# Patient Record
Sex: MALE | Race: WHITE
[De-identification: names, ages, dates, MRNs, and addresses within clinical notes are randomized per-mention and may not be internally consistent; named-entity substitution may affect disease eponyms.]

---

## 2017-04-11 ENCOUNTER — HOSPITAL ENCOUNTER (OUTPATIENT)
Dept: HOSPITAL 56 - MW.CHRC | Age: 82
Discharge: HOME | End: 2017-04-11
Attending: FAMILY MEDICINE
Payer: MEDICARE

## 2017-04-11 DIAGNOSIS — E03.9: Primary | ICD-10-CM

## 2017-04-11 DIAGNOSIS — E11.9: ICD-10-CM

## 2017-04-11 DIAGNOSIS — M15.9: ICD-10-CM

## 2017-04-11 DIAGNOSIS — I10: ICD-10-CM

## 2017-04-11 LAB
CHLORIDE SERPL-SCNC: 101 MMOL/L (ref 98–110)
SODIUM SERPL-SCNC: 133 MMOL/L (ref 136–146)

## 2017-04-11 PROCEDURE — G0463 HOSPITAL OUTPT CLINIC VISIT: HCPCS

## 2018-07-03 ENCOUNTER — HOSPITAL ENCOUNTER (EMERGENCY)
Dept: HOSPITAL 56 - MW.ED | Age: 83
LOS: 1 days | Discharge: LEFT BEFORE DISCHARGE | End: 2018-07-04
Payer: MEDICARE

## 2018-07-03 VITALS — DIASTOLIC BLOOD PRESSURE: 65 MMHG | SYSTOLIC BLOOD PRESSURE: 138 MMHG

## 2018-07-03 DIAGNOSIS — E03.9: ICD-10-CM

## 2018-07-03 DIAGNOSIS — H53.131: Primary | ICD-10-CM

## 2018-07-03 DIAGNOSIS — G30.9: ICD-10-CM

## 2018-07-03 DIAGNOSIS — F32.9: ICD-10-CM

## 2018-07-03 DIAGNOSIS — Z87.891: ICD-10-CM

## 2018-07-03 DIAGNOSIS — F02.80: ICD-10-CM

## 2018-07-03 DIAGNOSIS — Z88.7: ICD-10-CM

## 2018-07-03 DIAGNOSIS — Z79.899: ICD-10-CM

## 2018-07-03 DIAGNOSIS — D64.9: ICD-10-CM

## 2018-07-03 DIAGNOSIS — I10: ICD-10-CM

## 2018-07-03 DIAGNOSIS — J44.9: ICD-10-CM

## 2018-07-03 DIAGNOSIS — E11.9: ICD-10-CM

## 2018-07-03 DIAGNOSIS — Z79.84: ICD-10-CM

## 2018-07-04 NOTE — EDM.PDOC
ED HPI GENERAL MEDICAL PROBLEM





- General


Chief Complaint: ENT Problem


Stated Complaint: TROUBLE WITH EYES


Time Seen by Provider: 07/03/18 22:22


Source of Information: Reports: Patient, Family


History Limitations: Reports: No Limitations





- History of Present Illness


INITIAL COMMENTS - FREE TEXT/NARRATIVE: 





HISTORY AND PHYSICAL:





History of present illness:


85-year-old male presenting to the emergency department with family for chief 

complaint of acute right vision loss with past medical history of macular 

degeneration and dementia.





As per son he states that his father was complaining of some blurry vision 

yesterday which progressed to the point where his was not able to see out of 

his right eye. He has been no associated pain. Does have a history of macular 

degeneration. Son states that the ophthalmologist told him to come to the 

emergency department if he had any sudden vision loss and that is the primary 

reason why they came today. Patient denies any headache, jaw claudication, fever

, scalp tenderness.





I did talk to Dr. Soto on call ophthalmologist and reviewed the patient with 

him. He suspected that secondary to no pain or significant pupil changes this 

was not a emergency situation and that they could follow-up with him on 

Thursday morning at [address] [facility] [place]. I did talk to the the patient and 

his son about this.





When I return to get intraocular pressures the patient and son had eloped. 





Review of systems: 


As per history of present illness and below otherwise all systems reviewed and 

negative.





Past medical history: 


As per history of present illness and as reviewed below otherwise 

noncontributory.





Surgical history: 


As per history of present illness and as reviewed below otherwise 

noncontributory.





Social history: 


No reported history of drug or alcohol abuse.





Family history: 


As per history of present illness and as reviewed below otherwise 

noncontributory.





Physical exam:


HEENT: Atraumatic, normocephalic, pupils reactive, negative for conjunctival 

pallor or scleral icterus, mucous membranes moist, throat clear, neck supple, 

nontender, trachea midline.


Lungs: Clear to auscultation, breath sounds equal bilaterally, chest nontender.


Heart: S1S2, regular, negative for clicks, rubs, or JVD.


Abdomen: Soft, nondistended, nontender. Negative for masses or 

hepatosplenomegaly. Negative for costovertebral tenderness.


Pelvis: Stable nontender.


Genitourinary: Deferred.


Rectal: Deferred.


Extremities: Atraumatic, negative for cords or calf pain. Neurovascular 

unremarkable.


Neuro: Awake, alert, oriented. Cranial nerves II through XII unremarkable. 

Cerebellum unremarkable. Motor and sensory unremarkable throughout. Exam 

nonfocal.





Diagnostics:


[]





Therapeutics:


[]





Impression: 


Acute vision loss





Plan:


As above the patient eloped before I could get any further assessment. Please 

see H&P.








Definitive disposition and diagnosis as appropriate pending reevaluation and 

review of above.











- Related Data


 Allergies











Allergy/AdvReac Type Severity Reaction Status Date / Time


 


pneumococcal vaccine Allergy  Other Verified 07/03/18 22:51











Home Meds: 


 Home Meds





Cholecalciferol (Vitamin D3) [Vitamin D] 2,000 units PO DAILY 10/31/16 [History]


Donepezil HCl 10 mg PO BEDTIME 10/31/16 [History]


Fluticasone Propionate [Flovent] 1 spray NASBOTH DAILY 10/31/16 [History]


Levothyroxine Sodium [Synthroid] 175 mcg PO DAILY 10/31/16 [History]


NIFEdipine [Nifedipine ER] 30 mg PO DAILY 10/31/16 [History]


Sertraline HCl 100 mg PO BEDTIME 10/31/16 [History]


glipiZIDE [Glipizide ER] 2.5 mg PO DAILY 10/31/16 [History]


predniSONE 10 mg PO ASDIRECTED 10/31/16 [History]


Acetaminophen 650 mg PO Q4HR PRN 07/03/18 [History]


Acetaminophen with Codeine [Tylenol with Codeine #3 Tablet] 0 mg PO TID PRN 07/ 03/18 [History]


Albuterol [Ventolin HFA] 2 puff INH Q4HR PRN 07/03/18 [History]


Cholecalciferol (Vitamin D3) [Vitamin D] 2,000 unit PO DAILY 07/03/18 [History]


Diphenoxylate HCl/Atropine [Lomotil] 0.025 - 2.5 mg PO TID PRN 07/03/18 [History

]


Diphenoxylate HCl/Atropine [Lomotil] 1 tab PO Q6H PRN 07/03/18 [History]


Ferrous Sulfate 325 mg PO TID 07/03/18 [History]


Lactase 0 unit PO ASDIRECTED 07/03/18 [History]


Lidocaine 4% [Xylocaine 4% Top Soln] 50 ml TOP ASDIRECTED 07/03/18 [History]


Memantine [Namenda] 5 mg PO BID 07/03/18 [History]


Pantoprazole Sodium [Protonix] 20 mg PO ASDIRECTED 07/03/18 [History]


Vit A/Vit C/Vit E/Zinc/Copper [Preservision] 0 mg PO DAILY 07/03/18 [History]











Past Medical History


HEENT History: Reports: Hard of Hearing, Macular Degeneration


Other HEENT History: chronic Rhinitis


Cardiovascular History: Reports: Hypertension


Respiratory History: Reports: COPD, Other (See Below)


Other Respiratory History: Emphysema


Other Gastrointestinal History: hx ulcer, abnormal weight loss, personal 

history of diseases of digestive system, unspecified abdominal pain, diarrhea


Genitourinary History: Reports: None


Other Genitourinary History: UTI


Musculoskeletal History: Reports: Osteoarthritis, Other (See Below)


Other Musculoskeletal History: pain to left knee


Neurological History: Reports: Alzheimers Disease


Other Neuro History: Amnesia, mild cognitive impairment, personal history of 

diseases of the nervous system and sense organs, dementia without behavioral 

disturbance


Psychiatric History: Reports: Depression


Endocrine/Metabolic History: Reports: Diabetes, Type II, Hypothyroidism, Other (

See Below)


Other Endocrine/Metabolic History: Endocrine, nutritional and metabolic disease


Hematologic History: Reports: Anemia


Immunologic History: Reports: None


Oncologic (Cancer) History: Reports: None


Dermatologic History: Reports: Other (See Below)


Other Dermatologic History: ganglion, right wrist





- Past Surgical History


Head Surgeries/Procedures: Reports: None


HEENT Surgical History: Reports: Cataract Surgery


Cardiovascular Surgical History: Reports: None


Respiratory Surgical History: Reports: None


Other GI Surgeries/Procedures: hx of surgery for ulcer,  (procedure was done 50 

yrs ago.  son is not sure what was done)


Male  Surgical History: Reports: None


Endocrine Surgical History: Reports: None


Neurological Surgical History: Reports: None


Musculoskeletal Surgical History: Reports: None


Dermatological Surgical History: Reports: None





Social & Family History





- Family History


Family Medical History: Noncontributory





- Tobacco Use


Smoking Status *Q: Former Smoker


Used Tobacco, but Quit: Yes


Month/Year Tobacco Last Used: "45years ago"





- Caffeine Use


Caffeine Use: Reports: None





- Recreational Drug Use


Recreational Drug Use: No





ED ROS GENERAL





- Review of Systems


Review Of Systems: ROS reveals no pertinent complaints other than HPI.





ED EXAM, GENERAL





- Physical Exam


Exam: See Below





Course





- Vital Signs


Last Recorded V/S: 





 Last Vital Signs











Temp  97.6 F   07/03/18 22:37


 


Pulse  61   07/03/18 22:37


 


Resp  22 H  07/03/18 22:37


 


BP  138/65   07/03/18 22:37


 


Pulse Ox  96   07/03/18 22:37














Departure





- Departure


Time of Disposition: 00:11


Disposition: Eloped 07


Condition: Undetermined


Clinical Impression: 


Acute loss of vision


Qualifiers:


 Laterality: right Qualified Code(s): H53.131 - Sudden visual loss, right eye








- Discharge Information


Referrals: 


PCP,None [Primary Care Provider] -

## 2019-06-03 ENCOUNTER — HOSPITAL ENCOUNTER (INPATIENT)
Dept: HOSPITAL 56 - MW.ED | Age: 84
LOS: 4 days | Discharge: NURSING, REHAB OR LONG-TERM CARE | DRG: 871 | End: 2019-06-07
Attending: INTERNAL MEDICINE | Admitting: INTERNAL MEDICINE
Payer: MEDICARE

## 2019-06-03 DIAGNOSIS — Z66: ICD-10-CM

## 2019-06-03 DIAGNOSIS — Z79.899: ICD-10-CM

## 2019-06-03 DIAGNOSIS — Z51.5: ICD-10-CM

## 2019-06-03 DIAGNOSIS — H91.90: ICD-10-CM

## 2019-06-03 DIAGNOSIS — A41.89: Primary | ICD-10-CM

## 2019-06-03 DIAGNOSIS — F02.80: ICD-10-CM

## 2019-06-03 DIAGNOSIS — J44.9: ICD-10-CM

## 2019-06-03 DIAGNOSIS — D64.9: ICD-10-CM

## 2019-06-03 DIAGNOSIS — Z79.82: ICD-10-CM

## 2019-06-03 DIAGNOSIS — J43.9: ICD-10-CM

## 2019-06-03 DIAGNOSIS — F05: ICD-10-CM
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
## 2019-06-03 DIAGNOSIS — M19.90: ICD-10-CM

## 2019-06-03 DIAGNOSIS — R50.9: ICD-10-CM

## 2019-06-03 DIAGNOSIS — E03.9: ICD-10-CM

## 2019-06-03 DIAGNOSIS — Z79.4: ICD-10-CM

## 2019-06-03 DIAGNOSIS — H35.30: ICD-10-CM

## 2019-06-03 DIAGNOSIS — Z79.890: ICD-10-CM

## 2019-06-03 DIAGNOSIS — Z88.8: ICD-10-CM

## 2019-06-03 DIAGNOSIS — N17.9: ICD-10-CM

## 2019-06-03 DIAGNOSIS — E87.6: ICD-10-CM

## 2019-06-03 DIAGNOSIS — K59.09: ICD-10-CM

## 2019-06-03 DIAGNOSIS — Z98.49: ICD-10-CM

## 2019-06-03 DIAGNOSIS — J96.01: ICD-10-CM

## 2019-06-03 DIAGNOSIS — Z88.7: ICD-10-CM

## 2019-06-03 DIAGNOSIS — G93.41: ICD-10-CM

## 2019-06-03 DIAGNOSIS — R09.02: ICD-10-CM

## 2019-06-03 DIAGNOSIS — Z79.84: ICD-10-CM

## 2019-06-03 DIAGNOSIS — I10: ICD-10-CM

## 2019-06-03 DIAGNOSIS — G30.9: ICD-10-CM

## 2019-06-03 DIAGNOSIS — F32.9: ICD-10-CM

## 2019-06-03 DIAGNOSIS — E87.0: ICD-10-CM

## 2019-06-03 DIAGNOSIS — J18.9: ICD-10-CM

## 2019-06-03 DIAGNOSIS — R74.8: ICD-10-CM

## 2019-06-03 DIAGNOSIS — E11.9: ICD-10-CM

## 2019-06-03 DIAGNOSIS — Z79.52: ICD-10-CM

## 2019-06-03 DIAGNOSIS — M19.91: ICD-10-CM

## 2019-06-03 LAB
CHLORIDE SERPL-SCNC: 104 MMOL/L (ref 98–107)
SODIUM SERPL-SCNC: 142 MMOL/L (ref 136–148)

## 2019-06-03 PROCEDURE — C9113 INJ PANTOPRAZOLE SODIUM, VIA: HCPCS

## 2019-06-03 PROCEDURE — A4217 STERILE WATER/SALINE, 500 ML: HCPCS

## 2019-06-03 RX ADMIN — DEXTROSE SCH UNITS: 10 SOLUTION INTRAVENOUS at 17:00

## 2019-06-03 RX ADMIN — METHYLPREDNISOLONE SODIUM SUCCINATE SCH MG: 40 INJECTION, POWDER, FOR SOLUTION INTRAMUSCULAR; INTRAVENOUS at 21:08

## 2019-06-03 RX ADMIN — LEVOFLOXACIN SCH MLS/HR: 750 INJECTION, SOLUTION INTRAVENOUS at 17:00

## 2019-06-03 NOTE — CR
INDICATION:



Hypoxia



TECHNIQUE:



Chest 2 views



COMPARISON:



12/06/2018



FINDINGS:



Cardiovascular and mediastinum:  Heart size and vasculature are normal in 

caliber and appearance.  



Lungs and pleural spaces:  Possible right infrahilar infiltrate versus 

vascular crowding, however this is improved. Remainder of the lungs and 

pleural spaces are clear. 



Bones and soft tissues:  No significant findings.



IMPRESSION:



Persistent but improved infiltrate versus normal vascular crowding in the 

right infrahilar region. No other finding to explain hypoxia.



Dictated by Rafael Abraham MD @ [facility]  3 2019  1:33PM



Signed by Dr. Rafael Abraham @ [facility]  3 2019  1:38PM

## 2019-06-03 NOTE — CR
INDICATION:



Pain after fall.



TECHNIQUE:



One view.



IMPRESSION:



Moderate osteoarthritis of both hips. Mild osteoarthritis symphysis pubis 

and sacroiliac joints. No acute fracture or traumatic malalignment. Penile 

implants.



Dictated by Latrell Stockton MD @ [facility]  3 2019 12:48PM



Signed by Dr. Latrell Stockton @ [facility]  3 2019 12:48PM

## 2019-06-03 NOTE — CT
INDICATION:



Fall.



TECHNIQUE:



Multidetector imaging posture fossa to thoracic inlet.



FINDINGS:



Anatomic alignment. Mild degenerative disc disease C5-6 and C6-C7 as well 

as at C7-T1. Moderately severe facet arthrosis through the mid cervical 

spine more prominent on the right C3-4 through CE 5 6. No acute fracture or 

significant bone lesion. No neural foraminal or central canal bony 

encroachment of significance.



IMPRESSION:



No acute fracture or traumatic malalignment. Moderate degenerative joint 

disease and mild degenerative disc disease.



Please note that all CT scans at this facility use dose modulation, 

iterative reconstruction, and/or weight-based dosing when appropriate to 

reduce radiation dose to as low as reasonably achievable.



Dictated by Latrell Stockton MD @ [facility]  3 2019 12:40PM



Signed by Dr. Latrell Stockton @ [facility]  3 2019 12:42PM

## 2019-06-03 NOTE — EDM.PDOC
ED HPI GENERAL MEDICAL PROBLEM





- General


Chief Complaint: Trauma


Stated Complaint: FEVER


Time Seen by Provider: 06/03/19 11:47


Source of Information: Reports: Nursing Home Records


History Limitations: Reports: No Limitations, Other (End stage dementia)





- History of Present Illness


INITIAL COMMENTS - FREE TEXT/NARRATIVE: 


HISTORY AND PHYSICAL:





History of present illness:


Patient is an 86-year-old male, with history of end-stage dementia, presents to 

the ED today from [facility] for concern of hypoxia and 

fever. Per nursing staff, this morning patient was found with a recliner 

flipped and on the ground. The nursing staff had performed routinely vital 

signs and saw that patient had a fever 102. They're given him Tylenol and upon 

second vital check he goes oxygenation around 80% with a fever. They 

immediately transported him to the ER. In the ER, patient not able to answer 

questions or concerns due to underlying chronic dementia. Patient does take a 

daily aspirin.





Patient denies fever, chills, chest pain, shortness of breath, or cough. Denies 

headache, neck stiff ness, change in vision, syncope, or near syncope. Denies 

nausea, vomiting, abdominal pain, diarrhea, constipation, or dysuria. Has not 

noted any blood in urine or stool. Patient has been eating and drinking 

appropriately.





Review of systems: 


As per history of present illness and below otherwise all systems reviewed and 

negative.





Past medical history: 


As per history of present illness and as reviewed below otherwise 

noncontributory.





Surgical history: 


As per history of present illness and as reviewed below otherwise 

noncontributory.





Social history: 


See social history for further information





Family history: 


As per history of present illness and as reviewed below otherwise 

noncontributory.





Physical exam: Exam is limited due to patient's underlying dementia.


General: Patient is alert, and in no acute distress. Patient laying comfortably 

on exam table but combative with staff, noted to be combative per his baseline.


HEENT: Atraumatic, normocephalic, pupils equal and reactive bilaterally, 

negative for conjunctival pallor or scleral icterus, mucous membranes moist, 

TMs normal bilaterally, throat clear, neck supple, nontender, trachea midline. 

No drooling or trismus noted. No meningeal signs. No hot potato voice noted. 


Lungs: Diffuse bilateral coarse crackles to auscultation, breath sounds equal 

bilaterally, chest nontender.


Heart: S1S2, regular rate and rhythm without overt murmur


Abdomen: Soft, nondistended, nontender. Negative for masses or 

hepatosplenomegaly. Negative for costovertebral tenderness.


Pelvis: Stable nontender.


Genitourinary: Deferred.


Rectal: Deferred.


Skin: Intact, warm, dry. No lesions or rashes noted.


Extremities: Atraumatic, negative for cords or calf pain. Neurovascular 

unremarkable. No obvious deformities, step-offs, or crepitus of the complete 

spine.


Neuro: Awake, alert. Exam nonfocal.





Notes:


Trauma alert was called upon arrival to the ED. Dr. Joseph directly involved in 

patient care. 


78% on room air. Patient not able to tolerate nasal cannula. Placed on blow-by 

oxygen and stating 90%.


EKG does show LBB but this was also seen on EKG from 2016. Repeat EKG no change.


Elevated troponin, discussed this with patient's power of , his son, 

who did physically present to the ED. Son would desire admission to our 

hospital and not be transferred at this time. Patient is a DNR, code level 3.


Dr. Jones was contacted on patient and will admit to observation.





Diagnostics:


CBC, CMP, INR, troponin, EKG, UA, chest x-ray, pelvic x-ray, head CT, cervical 

spine CT, BNP





Therapeutics:


Ativan, duoneb





Impression: 


Elevated troponin


Hypoxia


Dementia





Plan:


1. Admit to observation to Dr. Jones





Definitive disposition and diagnosis as appropriate pending reevaluation and 

review of above.








- Related Data


 Allergies











Allergy/AdvReac Type Severity Reaction Status Date / Time


 


pneumococcal vaccine Allergy  Other Verified 06/03/19 11:59











Home Meds: 


 Home Meds





Donepezil HCl 10 mg PO BEDTIME 10/31/16 [History]


Fluticasone Propionate [Flovent] 1 spray NASBOTH DAILY 10/31/16 [History]


Levothyroxine Sodium [Synthroid] 175 mcg PO DAILY 10/31/16 [History]


NIFEdipine [Nifedipine ER] 30 mg PO DAILY 10/31/16 [History]


Sertraline HCl 100 mg PO BEDTIME 10/31/16 [History]


Acetaminophen 650 mg PO Q4HR PRN 07/03/18 [History]


Acetaminophen with Codeine [Tylenol with Codeine #3 Tablet] 1 tab PO TID PRN 07/ 03/18 [History]


Albuterol [Ventolin HFA] 2 puff INH Q4HR PRN 07/03/18 [History]


Ferrous Sulfate 325 mg PO TID 07/03/18 [History]


Lactase 0 unit PO ASDIRECTED 07/03/18 [History]


Lidocaine 4% [Xylocaine 4% Top Soln] 50 ml TOP ASDIRECTED 07/03/18 [History]


Memantine [Namenda] 5 mg PO BID 07/03/18 [History]


Pantoprazole Sodium [Protonix] 20 mg PO ASDIRECTED 07/03/18 [History]


Vit A/Vit C/Vit E/Zinc/Copper [Preservision] 2 tab PO DAILY 07/03/18 [History]


Aspirin 81 mg PO DAILY 06/03/19 [History]


Bisacodyl [Dulcolax] 1 supp RC Q72H PRN 06/03/19 [History]


Calcium Citrate/Vitamin D3 [Calcium Cit-Vit D 315-200] 1 tab PO DAILY 06/03/19 [

History]


Cholecalciferol (Vitamin D3) [Vitamin D3] 2,000 units PO DAILY 06/03/19 [History

]


Insulin Aspart [NovoLOG] 0 unit SUBCUT ASDIRECTED 06/03/19 [History]


Magnesium Hydroxide [Milk of Magnesia] 30 ml PO DAILY PRN 06/03/19 [History]


glipiZIDE [Glipizide ER] 5 mg PO ACBREAKFAST 06/03/19 [History]


predniSONE [Prednisone] 80 mg PO DAILY@14 06/03/19 [History]











Past Medical History


HEENT History: Reports: Hard of Hearing, Macular Degeneration


Other HEENT History: chronic Rhinitis


Cardiovascular History: Reports: Hypertension


Respiratory History: Reports: COPD, Other (See Below)


Other Respiratory History: Emphysema


Gastrointestinal History: Reports: Chronic Constipation, Other (See Below)


Other Gastrointestinal History: hx ulcer, abnormal weight loss, personal 

history of diseases of digestive system, unspecified abdominal pain, diarrhea


Genitourinary History: Reports: None


Other Genitourinary History: UTI


Musculoskeletal History: Reports: Osteoarthritis, Other (See Below)


Other Musculoskeletal History: pain to left knee


Neurological History: Reports: Alzheimers Disease


Other Neuro History: Amnesia, mild cognitive impairment, personal history of 

diseases of the nervous system and sense organs, dementia without behavioral 

disturbance


Psychiatric History: Reports: Dementia, Depression, Other (See Below)


Other Psychiatric History: behavioral disturbances


Endocrine/Metabolic History: Reports: Diabetes, Type II, Hypothyroidism, Other (

See Below)


Other Endocrine/Metabolic History: Endocrine, nutritional and metabolic disease


Hematologic History: Reports: Anemia


Immunologic History: Reports: None


Oncologic (Cancer) History: Reports: None


Dermatologic History: Reports: Other (See Below)


Other Dermatologic History: ganglion, right wrist





- Past Surgical History


Head Surgeries/Procedures: Reports: None


HEENT Surgical History: Reports: Cataract Surgery


Cardiovascular Surgical History: Reports: None


Respiratory Surgical History: Reports: None


GI Surgical History: Reports: Other (See Below)


Other GI Surgeries/Procedures: hx of surgery for ulcer,  (procedure was done 50 

yrs ago.  son is not sure what was done)


Male  Surgical History: Reports: None


Endocrine Surgical History: Reports: None


Neurological Surgical History: Reports: None


Musculoskeletal Surgical History: Reports: None


Dermatological Surgical History: Reports: None





Social & Family History





- Family History


Family Medical History: Noncontributory





- Tobacco Use


Smoking Status *Q: Unknown Ever Smoked





- Caffeine Use


Caffeine Use: Reports: None





Review of Systems





- Review of Systems


Review Of Systems: ROS reveals no pertinent complaints other than HPI.





ED EXAM, GENERAL





- Physical Exam


Exam: See Below (See dictation)





Course





- Vital Signs


Last Recorded V/S: 


 Last Vital Signs











Temp  37.6 C   06/03/19 11:59


 


Pulse  85   06/03/19 11:59


 


Resp  16   06/03/19 11:59


 


BP  156/65 H  06/03/19 11:59


 


Pulse Ox  90 L  06/03/19 12:49














- Orders/Labs/Meds


Orders: 


 Active Orders 24 hr











 Category Date Time Status


 


 Admission Status [Patient Status] [ADT] Stat ADT  06/03/19 14:46 Ordered


 


 EKG Documentation Completion [RC] STAT Care  06/03/19 11:48 Active


 


 EKG Documentation Completion [RC] STAT Care  06/03/19 13:47 Active


 


 RT Aerosol Therapy [RC] ASDIRECTED Care  06/03/19 14:21 Ordered


 


 B-TYPE NATRIURETIC PEPTIDE,BNP [CHEM] Stat Lab  06/03/19 14:07 Ordered











Labs: 


 Laboratory Tests











  06/03/19 06/03/19 06/03/19 Range/Units





  12:08 12:45 12:45 


 


WBC   5.80   (4.0-11.0)  K/uL


 


RBC   3.56 L   (4.50-5.90)  M/uL


 


Hgb   11.1 L   (13.0-17.0)  g/dL


 


Hct   33.5 L   (38.0-50.0)  %


 


MCV   94.1   (80.0-98.0)  fL


 


MCH   31.2   (27.0-32.0)  pg


 


MCHC   33.1   (31.0-37.0)  g/dL


 


RDW Std Deviation   49.5   (28.0-62.0)  fl


 


RDW Coeff of Randi   14   (11.0-15.0)  %


 


Plt Count   209   (150-400)  K/uL


 


MPV   10.30   (7.40-12.00)  fL


 


Add Manual Diff   YES   


 


Neutrophils % (Manual)   75   (48.0-80.0)  %


 


Band Neutrophils %   6   %


 


Lymphocytes % (Manual)   10 L   (16.0-40.0)  %


 


Monocytes % (Manual)   9   (0.0-15.0)  %


 


Nucleated RBC %   0.0   /100WBC


 


Absolute Seg Neuts   4.4   (1.4-5.7)  


 


Band Neutrophils #   0.3   


 


Lymphocytes # (Manual)   0.6   (0.6-2.4)  


 


Monocytes # (Manual)   0.5   (0.0-0.8)  


 


Nucleated RBCs #   0   K/uL


 


INR     


 


Sodium    142  (136-148)  mmol/L


 


Potassium    3.1 L  (3.5-5.1)  mmol/L


 


Chloride    104  ()  mmol/L


 


Carbon Dioxide    27.8  (21.0-32.0)  mmol/L


 


BUN    26 H  (7.0-18.0)  mg/dL


 


Creatinine    1.1  (0.8-1.3)  mg/dL


 


Est Cr Clr Drug Dosing    55.54  mL/min


 


Estimated GFR (MDRD)    > 60.0  ml/min


 


Glucose    97  ()  mg/dL


 


Calcium    9.5  (8.5-10.1)  mg/dL


 


Total Bilirubin    1.0  (0.2-1.0)  mg/dL


 


AST    29  (15-37)  IU/L


 


ALT    20  (14-63)  IU/L


 


Alkaline Phosphatase    60  ()  U/L


 


Troponin I     (0.000-0.056)  ng/mL


 


Total Protein    7.1  (6.4-8.2)  g/dL


 


Albumin    3.2 L  (3.4-5.0)  g/dL


 


Globulin    3.9  (2.6-4.0)  g/dL


 


Albumin/Globulin Ratio    0.8 L  (0.9-1.6)  


 


Urine Color  YELLOW    


 


Urine Appearance  CLEAR    


 


Urine pH  7.0    (5.0-8.0)  


 


Ur Specific Gravity  1.015    (1.001-1.035)  


 


Urine Protein  100 H    (NEGATIVE)  mg/dL


 


Urine Glucose (UA)  NEGATIVE    (NEGATIVE)  mg/dL


 


Urine Ketones  NEGATIVE    (NEGATIVE)  mg/dL


 


Urine Occult Blood  SMALL H    (NEGATIVE)  


 


Urine Nitrite  NEGATIVE    (NEGATIVE)  


 


Urine Bilirubin  NEGATIVE    (NEGATIVE)  


 


Urine Urobilinogen  1.0    (<2.0)  EU/dL


 


Ur Leukocyte Esterase  NEGATIVE    (NEGATIVE)  


 


Urine RBC  0-2    (0-2/HPF)  


 


Urine WBC  0-2    (0-5/HPF)  


 


Ur Epithelial Cells  OCCASIONAL    (NONE-FEW)  


 


Amorphous Sediment  LIGHT    (NEGATIVE)  


 


Urine Bacteria  FEW    (NEGATIVE)  














  06/03/19 06/03/19 Range/Units





  12:45 12:45 


 


WBC    (4.0-11.0)  K/uL


 


RBC    (4.50-5.90)  M/uL


 


Hgb    (13.0-17.0)  g/dL


 


Hct    (38.0-50.0)  %


 


MCV    (80.0-98.0)  fL


 


MCH    (27.0-32.0)  pg


 


MCHC    (31.0-37.0)  g/dL


 


RDW Std Deviation    (28.0-62.0)  fl


 


RDW Coeff of Randi    (11.0-15.0)  %


 


Plt Count    (150-400)  K/uL


 


MPV    (7.40-12.00)  fL


 


Add Manual Diff    


 


Neutrophils % (Manual)    (48.0-80.0)  %


 


Band Neutrophils %    %


 


Lymphocytes % (Manual)    (16.0-40.0)  %


 


Monocytes % (Manual)    (0.0-15.0)  %


 


Nucleated RBC %    /100WBC


 


Absolute Seg Neuts    (1.4-5.7)  


 


Band Neutrophils #    


 


Lymphocytes # (Manual)    (0.6-2.4)  


 


Monocytes # (Manual)    (0.0-0.8)  


 


Nucleated RBCs #    K/uL


 


INR  0.99   


 


Sodium    (136-148)  mmol/L


 


Potassium    (3.5-5.1)  mmol/L


 


Chloride    ()  mmol/L


 


Carbon Dioxide    (21.0-32.0)  mmol/L


 


BUN    (7.0-18.0)  mg/dL


 


Creatinine    (0.8-1.3)  mg/dL


 


Est Cr Clr Drug Dosing    mL/min


 


Estimated GFR (MDRD)    ml/min


 


Glucose    ()  mg/dL


 


Calcium    (8.5-10.1)  mg/dL


 


Total Bilirubin    (0.2-1.0)  mg/dL


 


AST    (15-37)  IU/L


 


ALT    (14-63)  IU/L


 


Alkaline Phosphatase    ()  U/L


 


Troponin I   0.187 H*  (0.000-0.056)  ng/mL


 


Total Protein    (6.4-8.2)  g/dL


 


Albumin    (3.4-5.0)  g/dL


 


Globulin    (2.6-4.0)  g/dL


 


Albumin/Globulin Ratio    (0.9-1.6)  


 


Urine Color    


 


Urine Appearance    


 


Urine pH    (5.0-8.0)  


 


Ur Specific Gravity    (1.001-1.035)  


 


Urine Protein    (NEGATIVE)  mg/dL


 


Urine Glucose (UA)    (NEGATIVE)  mg/dL


 


Urine Ketones    (NEGATIVE)  mg/dL


 


Urine Occult Blood    (NEGATIVE)  


 


Urine Nitrite    (NEGATIVE)  


 


Urine Bilirubin    (NEGATIVE)  


 


Urine Urobilinogen    (<2.0)  EU/dL


 


Ur Leukocyte Esterase    (NEGATIVE)  


 


Urine RBC    (0-2/HPF)  


 


Urine WBC    (0-5/HPF)  


 


Ur Epithelial Cells    (NONE-FEW)  


 


Amorphous Sediment    (NEGATIVE)  


 


Urine Bacteria    (NEGATIVE)  











Meds: 


Medications














Discontinued Medications














Generic Name Dose Route Start Last Admin





  Trade Name Lyleq  PRN Reason Stop Dose Admin


 


Albuterol/Ipratropium  3 ml  06/03/19 14:20  06/03/19 14:35





  Duoneb 3.0-0.5 Mg/3 Ml  NEB  06/03/19 14:21  3 ml





  ONETIME ONE   Administration





     





     





     





     


 


Lorazepam  0.5 mg  06/03/19 11:52  06/03/19 11:59





  Ativan  IM  06/03/19 11:53  0.5 mg





  ONETIME ONE   Administration





     





     





     





     














Departure





- Departure


Time of Disposition: 14:26


Disposition: Refer to Observation


Clinical Impression: 


 Elevated troponin, Hypoxia





Dementia


Qualifiers:


 Dementia type: unspecified type Dementia behavioral disturbance: with 

behavioral disturbance Qualified Code(s): F03.91 - Unspecified dementia with 

behavioral disturbance








- Discharge Information


Referrals: 


PCP,Unknown [Primary Care Provider] - 





- My Orders


Last 24 Hours: 


My Active Orders





06/03/19 11:48


EKG Documentation Completion [RC] STAT 





06/03/19 13:47


EKG Documentation Completion [RC] STAT 





06/03/19 14:07


B-TYPE NATRIURETIC PEPTIDE,BNP [CHEM] Stat 





06/03/19 14:21


RT Aerosol Therapy [RC] ASDIRECTED 





06/03/19 14:46


Admission Status [Patient Status] [ADT] Stat 














- Assessment/Plan


Last 24 Hours: 


My Active Orders





06/03/19 11:48


EKG Documentation Completion [RC] STAT 





06/03/19 13:47


EKG Documentation Completion [RC] STAT 





06/03/19 14:07


B-TYPE NATRIURETIC PEPTIDE,BNP [CHEM] Stat 





06/03/19 14:21


RT Aerosol Therapy [RC] ASDIRECTED 





06/03/19 14:46


Admission Status [Patient Status] [ADT] Stat

## 2019-06-03 NOTE — PCM.HP
H&P History of Present Illness





- General


Date of Service: 06/03/19


Admit Problem/Dx: 


 Admission Diagnosis/Problem





Admission Diagnosis/Problem      Elevated troponin level











- History of Present Illness


Initial Comments - Free Text/Narative: 





87 yo male with pmh of dementia, COPD, DM who presents to the ED from [place] 

following an unwitness fall.  He was found sitting on the floor in his room 

next to his recliner, the recliner was tipped on its side.  His BP was 144/82, 

Temp 101.2, RR18, O2 sat of 85% on room air. He was noted to have a BS of 91.  

CT Head,spin and pelvic x-ray were unremarkable.  CXR showed possible 

infiltrate of right infrahilar region.  Patient was combative in the ED so he 

was given Ativan.  He currently denies any pain, or shortness of breath.





- Related Data


Allergies/Adverse Reactions: 


 Allergies











Allergy/AdvReac Type Severity Reaction Status Date / Time


 


pneumococcal vaccine Allergy  Other Verified 06/03/19 11:59











Home Medications: 


 Home Meds





Donepezil HCl 10 mg PO BEDTIME 10/31/16 [History]


Fluticasone Propionate [Flovent] 1 spray NASBOTH DAILY 10/31/16 [History]


Levothyroxine Sodium [Synthroid] 175 mcg PO DAILY 10/31/16 [History]


NIFEdipine [Nifedipine ER] 30 mg PO DAILY 10/31/16 [History]


Sertraline HCl 100 mg PO BEDTIME 10/31/16 [History]


Acetaminophen 650 mg PO Q4HR PRN 07/03/18 [History]


Acetaminophen with Codeine [Tylenol with Codeine #3 Tablet] 1 tab PO TID PRN 07/ 03/18 [History]


Albuterol [Ventolin HFA] 2 puff INH Q4HR PRN 07/03/18 [History]


Ferrous Sulfate 325 mg PO TID 07/03/18 [History]


Lactase 0 unit PO ASDIRECTED 07/03/18 [History]


Lidocaine 4% [Xylocaine 4% Top Soln] 50 ml TOP ASDIRECTED 07/03/18 [History]


Memantine [Namenda] 5 mg PO BID 07/03/18 [History]


Pantoprazole Sodium [Protonix] 20 mg PO ASDIRECTED 07/03/18 [History]


Vit A/Vit C/Vit E/Zinc/Copper [Preservision] 2 tab PO DAILY 07/03/18 [History]


Aspirin 81 mg PO DAILY 06/03/19 [History]


Bisacodyl [Dulcolax] 1 supp RC Q72H PRN 06/03/19 [History]


Calcium Citrate/Vitamin D3 [Calcium Cit-Vit D 315-200] 1 tab PO DAILY 06/03/19 [

History]


Cholecalciferol (Vitamin D3) [Vitamin D3] 2,000 units PO DAILY 06/03/19 [History

]


Insulin Aspart [NovoLOG] 0 unit SUBCUT ASDIRECTED 06/03/19 [History]


Magnesium Hydroxide [Milk of Magnesia] 30 ml PO DAILY PRN 06/03/19 [History]


glipiZIDE [Glipizide ER] 5 mg PO ACBREAKFAST 06/03/19 [History]


predniSONE [Prednisone] 80 mg PO DAILY@14 06/03/19 [History]











Past Medical History


HEENT History: Reports: Hard of Hearing, Macular Degeneration


Other HEENT History: chronic Rhinitis


Cardiovascular History: Reports: Hypertension


Respiratory History: Reports: COPD, Other (See Below)


Other Respiratory History: Emphysema


Gastrointestinal History: Reports: Chronic Constipation, Other (See Below)


Other Gastrointestinal History: hx ulcer, abnormal weight loss, personal 

history of diseases of digestive system, unspecified abdominal pain, diarrhea


Genitourinary History: Reports: None


Other Genitourinary History: UTI


Musculoskeletal History: Reports: Osteoarthritis, Other (See Below)


Other Musculoskeletal History: pain to left knee


Neurological History: Reports: Alzheimers Disease


Other Neuro History: Amnesia, mild cognitive impairment, personal history of 

diseases of the nervous system and sense organs, dementia without behavioral 

disturbance


Psychiatric History: Reports: Dementia, Depression, Other (See Below)


Other Psychiatric History: behavioral disturbances


Endocrine/Metabolic History: Reports: Diabetes, Type II, Hypothyroidism, Other (

See Below)


Other Endocrine/Metabolic History: Endocrine, nutritional and metabolic disease


Hematologic History: Reports: Anemia


Immunologic History: Reports: None


Oncologic (Cancer) History: Reports: None


Dermatologic History: Reports: Other (See Below)


Other Dermatologic History: ganglion, right wrist





- Past Surgical History


Head Surgeries/Procedures: Reports: None


HEENT Surgical History: Reports: Cataract Surgery


Cardiovascular Surgical History: Reports: None


Respiratory Surgical History: Reports: None


GI Surgical History: Reports: Other (See Below)


Other GI Surgeries/Procedures: hx of surgery for ulcer,  (procedure was done 50 

yrs ago.  son is not sure what was done)


Male  Surgical History: Reports: None


Endocrine Surgical History: Reports: None


Neurological Surgical History: Reports: None


Musculoskeletal Surgical History: Reports: None


Dermatological Surgical History: Reports: None





Social & Family History





- Family History


Family Medical History: Noncontributory





- Tobacco Use


Smoking Status *Q: Unknown Ever Smoked





- Caffeine Use


Caffeine Use: Reports: None





H&P Review of Systems





- Review of Systems:


Review Of Systems: ROS reveals no pertinent complaints other than HPI.





Exam





- Exam


Exam: See Below





- Vital Signs


Vital Signs: 


 Last Vital Signs











Temp  37.6 C   06/03/19 11:59


 


Pulse  85   06/03/19 11:59


 


Resp  16   06/03/19 11:59


 


BP  156/65 H  06/03/19 11:59


 


Pulse Ox  90 L  06/03/19 12:49











Weight: 81.465 kg





- Exam


General: Lethargic.  No: Mild Distress


HEENT: Mucosa Moist & Pink


Neck: Supple


Lungs: Rhonchi, Other (wet cough)


Cardiovascular: Regular Rate, Regular Rhythm


GI/Abdominal Exam: Normal Bowel Sounds, Soft, Non-Tender


Extremities: Non-Tender, No Pedal Edema


Skin: Warm, Dry, Intact


Neurological: No: Focal Deficit





- Patient Data


Lab Results Last 24 hrs: 


 Laboratory Results - last 24 hr











  06/03/19 06/03/19 06/03/19 Range/Units





  12:08 12:45 12:45 


 


WBC   5.80   (4.0-11.0)  K/uL


 


RBC   3.56 L   (4.50-5.90)  M/uL


 


Hgb   11.1 L   (13.0-17.0)  g/dL


 


Hct   33.5 L   (38.0-50.0)  %


 


MCV   94.1   (80.0-98.0)  fL


 


MCH   31.2   (27.0-32.0)  pg


 


MCHC   33.1   (31.0-37.0)  g/dL


 


RDW Std Deviation   49.5   (28.0-62.0)  fl


 


RDW Coeff of Randi   14   (11.0-15.0)  %


 


Plt Count   209   (150-400)  K/uL


 


MPV   10.30   (7.40-12.00)  fL


 


Add Manual Diff   YES   


 


Neutrophils % (Manual)   75   (48.0-80.0)  %


 


Band Neutrophils %   6   %


 


Lymphocytes % (Manual)   10 L   (16.0-40.0)  %


 


Monocytes % (Manual)   9   (0.0-15.0)  %


 


Nucleated RBC %   0.0   /100WBC


 


Absolute Seg Neuts   4.4   (1.4-5.7)  


 


Band Neutrophils #   0.3   


 


Lymphocytes # (Manual)   0.6   (0.6-2.4)  


 


Monocytes # (Manual)   0.5   (0.0-0.8)  


 


Nucleated RBCs #   0   K/uL


 


INR     


 


Sodium    142  (136-148)  mmol/L


 


Potassium    3.1 L  (3.5-5.1)  mmol/L


 


Chloride    104  ()  mmol/L


 


Carbon Dioxide    27.8  (21.0-32.0)  mmol/L


 


BUN    26 H  (7.0-18.0)  mg/dL


 


Creatinine    1.1  (0.8-1.3)  mg/dL


 


Est Cr Clr Drug Dosing    55.54  mL/min


 


Estimated GFR (MDRD)    > 60.0  ml/min


 


Glucose    97  ()  mg/dL


 


Calcium    9.5  (8.5-10.1)  mg/dL


 


Total Bilirubin    1.0  (0.2-1.0)  mg/dL


 


AST    29  (15-37)  IU/L


 


ALT    20  (14-63)  IU/L


 


Alkaline Phosphatase    60  ()  U/L


 


Troponin I     (0.000-0.056)  ng/mL


 


Total Protein    7.1  (6.4-8.2)  g/dL


 


Albumin    3.2 L  (3.4-5.0)  g/dL


 


Globulin    3.9  (2.6-4.0)  g/dL


 


Albumin/Globulin Ratio    0.8 L  (0.9-1.6)  


 


Urine Color  YELLOW    


 


Urine Appearance  CLEAR    


 


Urine pH  7.0    (5.0-8.0)  


 


Ur Specific Gravity  1.015    (1.001-1.035)  


 


Urine Protein  100 H    (NEGATIVE)  mg/dL


 


Urine Glucose (UA)  NEGATIVE    (NEGATIVE)  mg/dL


 


Urine Ketones  NEGATIVE    (NEGATIVE)  mg/dL


 


Urine Occult Blood  SMALL H    (NEGATIVE)  


 


Urine Nitrite  NEGATIVE    (NEGATIVE)  


 


Urine Bilirubin  NEGATIVE    (NEGATIVE)  


 


Urine Urobilinogen  1.0    (<2.0)  EU/dL


 


Ur Leukocyte Esterase  NEGATIVE    (NEGATIVE)  


 


Urine RBC  0-2    (0-2/HPF)  


 


Urine WBC  0-2    (0-5/HPF)  


 


Ur Epithelial Cells  OCCASIONAL    (NONE-FEW)  


 


Amorphous Sediment  LIGHT    (NEGATIVE)  


 


Urine Bacteria  FEW    (NEGATIVE)  














  06/03/19 06/03/19 Range/Units





  12:45 12:45 


 


WBC    (4.0-11.0)  K/uL


 


RBC    (4.50-5.90)  M/uL


 


Hgb    (13.0-17.0)  g/dL


 


Hct    (38.0-50.0)  %


 


MCV    (80.0-98.0)  fL


 


MCH    (27.0-32.0)  pg


 


MCHC    (31.0-37.0)  g/dL


 


RDW Std Deviation    (28.0-62.0)  fl


 


RDW Coeff of Randi    (11.0-15.0)  %


 


Plt Count    (150-400)  K/uL


 


MPV    (7.40-12.00)  fL


 


Add Manual Diff    


 


Neutrophils % (Manual)    (48.0-80.0)  %


 


Band Neutrophils %    %


 


Lymphocytes % (Manual)    (16.0-40.0)  %


 


Monocytes % (Manual)    (0.0-15.0)  %


 


Nucleated RBC %    /100WBC


 


Absolute Seg Neuts    (1.4-5.7)  


 


Band Neutrophils #    


 


Lymphocytes # (Manual)    (0.6-2.4)  


 


Monocytes # (Manual)    (0.0-0.8)  


 


Nucleated RBCs #    K/uL


 


INR  0.99   


 


Sodium    (136-148)  mmol/L


 


Potassium    (3.5-5.1)  mmol/L


 


Chloride    ()  mmol/L


 


Carbon Dioxide    (21.0-32.0)  mmol/L


 


BUN    (7.0-18.0)  mg/dL


 


Creatinine    (0.8-1.3)  mg/dL


 


Est Cr Clr Drug Dosing    mL/min


 


Estimated GFR (MDRD)    ml/min


 


Glucose    ()  mg/dL


 


Calcium    (8.5-10.1)  mg/dL


 


Total Bilirubin    (0.2-1.0)  mg/dL


 


AST    (15-37)  IU/L


 


ALT    (14-63)  IU/L


 


Alkaline Phosphatase    ()  U/L


 


Troponin I   0.187 H*  (0.000-0.056)  ng/mL


 


Total Protein    (6.4-8.2)  g/dL


 


Albumin    (3.4-5.0)  g/dL


 


Globulin    (2.6-4.0)  g/dL


 


Albumin/Globulin Ratio    (0.9-1.6)  


 


Urine Color    


 


Urine Appearance    


 


Urine pH    (5.0-8.0)  


 


Ur Specific Gravity    (1.001-1.035)  


 


Urine Protein    (NEGATIVE)  mg/dL


 


Urine Glucose (UA)    (NEGATIVE)  mg/dL


 


Urine Ketones    (NEGATIVE)  mg/dL


 


Urine Occult Blood    (NEGATIVE)  


 


Urine Nitrite    (NEGATIVE)  


 


Urine Bilirubin    (NEGATIVE)  


 


Urine Urobilinogen    (<2.0)  EU/dL


 


Ur Leukocyte Esterase    (NEGATIVE)  


 


Urine RBC    (0-2/HPF)  


 


Urine WBC    (0-5/HPF)  


 


Ur Epithelial Cells    (NONE-FEW)  


 


Amorphous Sediment    (NEGATIVE)  


 


Urine Bacteria    (NEGATIVE)  











Result Diagrams: 


 06/04/19 03:00





 06/04/19 03:00


Problem List Initiated/Reviewed/Updated: Yes


Orders Last 24hrs: 


 Active Orders 24 hr











 Category Date Time Status


 


 Admission Status [Patient Status] [ADT] Stat ADT  06/03/19 14:46 Active


 


 Antiembolic Devices [RC] PER UNIT ROUTINE Care  06/03/19 14:52 Ordered


 


 EKG Documentation Completion [RC] STAT Care  06/03/19 11:48 Active


 


 EKG Documentation Completion [RC] STAT Care  06/03/19 13:47 Active


 


 Oxygen Therapy [RC] PRN Care  06/03/19 14:51 Ordered


 


 RT Aerosol Therapy [RC] ASDIRECTED Care  06/03/19 14:21 Active


 


 VTE/DVT Education [RC] PER UNIT ROUTINE Care  06/03/19 14:51 Ordered


 


 Vital Signs [RC] Q4H Care  06/03/19 14:51 Ordered


 


 American Diabetic [employer] Diet [DIET] Diet  06/03/19 Breakfast Ordered


 


 B-TYPE NATRIURETIC PEPTIDE,BNP [CHEM] Stat Lab  06/03/19 14:07 Ordered


 


 BASIC METABOLIC PANEL,BMP [CHEM] AM Lab  06/04/19 05:11 Ordered


 


 CBC WITH AUTO DIFF [HEME] AM Lab  06/04/19 05:11 Ordered


 


 CULTURE BLOOD [BC] Stat Lab  06/03/19 14:47 Ordered


 


 CULTURE BLOOD [BC] Stat Lab  06/03/19 14:47 Ordered


 


 TROPONIN I [CHEM] Q6H Lab  06/03/19 19:00 Ordered


 


 TROPONIN I [CHEM] Q6H Lab  06/04/19 01:00 Ordered


 


 Aspirin Med  06/03/19 15:00 Ordered





 325 mg PO DAILY   


 


 Donepezil [Aricept] Med  06/03/19 21:00 Ordered





 10 mg PO BEDTIME   


 


 Heparin Sodium Med  06/03/19 15:00 Ordered





 5,000 units SUBCUT Q12H   


 


 Levofloxacin/Dextrose 5%-Water [Levaquin in D5W 750 MG/ Med  06/03/19 15:00 

Ordered





 150 ML] 750 mg   





 Premix Bag 1 bag   





 IV Q24H   


 


 Levothyroxine Sodium Med  06/04/19 09:00 Ordered





 175 mcg PO DAILY   


 


 Memantine [Namenda] Med  06/03/19 21:00 Ordered





 5 mg PO BID   


 


 Ondansetron [Zofran] Med  06/03/19 14:51 Ordered





 4 mg IVPUSH Q4H PRN   


 


 Pantoprazole Sodium [Protonix] Med  06/03/19 15:00 Ordered





 20 mg PO ASDIRECTED   


 


 predniSONE Med  06/04/19 14:00 Ordered





 80 mg PO DAILY@14   


 


 Blood Culture x2 Reflex Set [OM.PC] Stat Oth  06/03/19 14:47 Ordered


 


 Sequential Compression Device [OM.PC] Per Unit Routine Oth  06/03/19 14:51 

Ordered


 


 Resuscitation Status Routine Resus Stat  06/03/19 14:51 Ordered








 Medication Orders





Aspirin (Aspirin)  325 mg PO DAILY [facility]


Donepezil HCl (Aricept)  10 mg PO BEDTIME [facility]


Heparin Sodium (Porcine) (Heparin Sodium)  5,000 units SUBCUT Q12H [facility]


Levofloxacin/Dextrose 750 mg/ (Premix)  150 mls @ 100 mls/hr IV Q24H [facility]


Memantine (Namenda)  5 mg PO BID [facility]


Non-Formulary Medication (Levothyroxine Sodium)  175 mcg PO DAILY [facility]


Non-Formulary Medication (Pantoprazole Sodium [Protonix])  20 mg PO ASDIRECTED 

[facility]


Ondansetron HCl (Zofran)  4 mg IVPUSH Q4H PRN


   PRN Reason: Nausea


Prednisone (Prednisone)  80 mg PO DAILY@14 [facility]








Assessment/Plan Comment:: 





87 yo male admitted following fall.  We will admit for suspect pneumonia and 

troponin leak.


Pneumonia: patient has fever, cough and CXR findings consistent of possible 

pneumonia.  We will treat with Levaquin,  He is blow by oxygen as he will not 

keep NC on.


Elevated troponin: will continue to trend


Dementia/delirium: patient received Ativan in the ED.

## 2019-06-03 NOTE — CT
INDICATION:



Fall. Patient on blood thinners.



TECHNIQUE:



Head CT without contrast.



COMPARISON:



None 



FINDINGS:



CSF spaces: Within normal limits for age. 



Brain parenchyma: There are nonspecific low attenuation white matter 

changes consistent with chronic microvascular disease. No sign of mass, 

hemorrhage, or midline shift. 



Skull base and calvarium: Bony thickening of the margins of the right 

maxillary sinus with chronic appearing mild mucosal thickening. The 

visualized paranasal sinuses and mastoid air cells are otherwise clear. The 

visualized orbits are grossly unremarkable. No skull fractures. There is 

intracranial atherosclerosis. 



IMPRESSION:



1. No acute findings. 



2. Nonspecific white matter disease, typical of chronic microvascular 

disease. 



3. Chronic sinusitis changes right maxillary sinus.



Please note that all CT scans at this facility use dose modulation, 

iterative reconstruction, and/or weight-based dosing when appropriate to 

reduce radiation dose to as low as reasonably achievable.



Dictated by Latrell Stockton MD @ [facility]  3 2019 12:39PM



Signed by Dr. Latrell Stockton @ [facility]  3 2019 12:40PM

## 2019-06-04 LAB
CHLORIDE SERPL-SCNC: 105 MMOL/L (ref 98–107)
SODIUM SERPL-SCNC: 145 MMOL/L (ref 136–148)

## 2019-06-04 RX ADMIN — DEXTROSE SCH: 10 SOLUTION INTRAVENOUS at 03:19

## 2019-06-04 RX ADMIN — DEXTROSE SCH: 10 SOLUTION INTRAVENOUS at 16:45

## 2019-06-04 RX ADMIN — METHYLPREDNISOLONE SODIUM SUCCINATE SCH MG: 40 INJECTION, POWDER, FOR SOLUTION INTRAMUSCULAR; INTRAVENOUS at 20:38

## 2019-06-04 RX ADMIN — LEVOFLOXACIN SCH MLS/HR: 750 INJECTION, SOLUTION INTRAVENOUS at 16:47

## 2019-06-04 RX ADMIN — LORAZEPAM PRN MG: 2 INJECTION INTRAMUSCULAR; INTRAVENOUS at 23:07

## 2019-06-04 RX ADMIN — METHYLPREDNISOLONE SODIUM SUCCINATE SCH MG: 40 INJECTION, POWDER, FOR SOLUTION INTRAMUSCULAR; INTRAVENOUS at 08:26

## 2019-06-04 NOTE — PCM.PN
- General Info


Date of Service: 06/04/19


Subjective Update: 





Overnight patient was restless and attempting to get off bed.


This morning, patient was sleeping and was arousable but not oriented. Denied 

pain.





- Patient Data


Vitals - Most Recent: 


 Last Vital Signs











Temp  37.2 C   06/04/19 17:00


 


Pulse  66   06/04/19 07:00


 


Resp  40 H  06/04/19 18:00


 


BP  180/97 H  06/04/19 18:00


 


Pulse Ox  91 L  06/04/19 18:00











Weight - Most Recent: 163.3 kg


I&O - Last 24 Hours: 


 Intake & Output











 06/04/19 06/04/19 06/04/19





 06:59 14:59 22:59


 


Intake Total   240


 


Output Total  500 470


 


Balance  -500 -230











Lab Results Last 24 Hours: 


 Laboratory Results - last 24 hr











  06/03/19 06/03/19 06/04/19 Range/Units





  12:45 21:02 00:42 


 


WBC     (4.0-11.0)  K/uL


 


RBC     (4.50-5.90)  M/uL


 


Hgb     (13.0-17.0)  g/dL


 


Hct     (38.0-50.0)  %


 


MCV     (80.0-98.0)  fL


 


MCH     (27.0-32.0)  pg


 


MCHC     (31.0-37.0)  g/dL


 


RDW Std Deviation     (28.0-62.0)  fl


 


RDW Coeff of Randi     (11.0-15.0)  %


 


Plt Count     (150-400)  K/uL


 


MPV     (7.40-12.00)  fL


 


Add Manual Diff     


 


Neutrophils % (Manual)     (48.0-80.0)  %


 


Band Neutrophils %     %


 


Lymphocytes % (Manual)     (16.0-40.0)  %


 


Monocytes % (Manual)     (0.0-15.0)  %


 


Nucleated RBC %     /100WBC


 


Absolute Seg Neuts     (1.4-5.7)  


 


Band Neutrophils #     


 


Lymphocytes # (Manual)     (0.6-2.4)  


 


Monocytes # (Manual)     (0.0-0.8)  


 


Nucleated RBCs #     K/uL


 


Sodium     (136-148)  mmol/L


 


Potassium     (3.5-5.1)  mmol/L


 


Chloride     ()  mmol/L


 


Carbon Dioxide     (21.0-32.0)  mmol/L


 


BUN     (7.0-18.0)  mg/dL


 


Creatinine     (0.8-1.3)  mg/dL


 


Est Cr Clr Drug Dosing     mL/min


 


Estimated GFR (MDRD)     ml/min


 


Glucose     ()  mg/dL


 


POC Glucose    158 H  ()  mg/dL


 


Calcium     (8.5-10.1)  mg/dL


 


Troponin I  Cancelled  0.153 H*   














  06/04/19 06/04/19 06/04/19 Range/Units





  03:00 03:00 03:00 


 


WBC  4.54    (4.0-11.0)  K/uL


 


RBC  3.72 L    (4.50-5.90)  M/uL


 


Hgb  11.6 L    (13.0-17.0)  g/dL


 


Hct  35.2 L    (38.0-50.0)  %


 


MCV  94.6    (80.0-98.0)  fL


 


MCH  31.2    (27.0-32.0)  pg


 


MCHC  33.0    (31.0-37.0)  g/dL


 


RDW Std Deviation  49.5    (28.0-62.0)  fl


 


RDW Coeff of Randi  14    (11.0-15.0)  %


 


Plt Count  176    (150-400)  K/uL


 


MPV  9.50    (7.40-12.00)  fL


 


Add Manual Diff  YES    


 


Neutrophils % (Manual)  82 H    (48.0-80.0)  %


 


Band Neutrophils %  10    %


 


Lymphocytes % (Manual)  5 L    (16.0-40.0)  %


 


Monocytes % (Manual)  3    (0.0-15.0)  %


 


Nucleated RBC %  0.0    /100WBC


 


Absolute Seg Neuts  3.7    (1.4-5.7)  


 


Band Neutrophils #  0.5    


 


Lymphocytes # (Manual)  0.2 L    (0.6-2.4)  


 


Monocytes # (Manual)  0.1    (0.0-0.8)  


 


Nucleated RBCs #  0    K/uL


 


Sodium   145   (136-148)  mmol/L


 


Potassium   3.6   (3.5-5.1)  mmol/L


 


Chloride   105   ()  mmol/L


 


Carbon Dioxide   27.8   (21.0-32.0)  mmol/L


 


BUN   24 H   (7.0-18.0)  mg/dL


 


Creatinine   1.2   (0.8-1.3)  mg/dL


 


Est Cr Clr Drug Dosing   47.63   mL/min


 


Estimated GFR (MDRD)   57.4   ml/min


 


Glucose   136 H   ()  mg/dL


 


POC Glucose     ()  mg/dL


 


Calcium   9.3   (8.5-10.1)  mg/dL


 


Troponin I    0.135 H*  














  06/04/19 06/04/19 06/04/19 Range/Units





  08:42 11:52 18:19 


 


WBC     (4.0-11.0)  K/uL


 


RBC     (4.50-5.90)  M/uL


 


Hgb     (13.0-17.0)  g/dL


 


Hct     (38.0-50.0)  %


 


MCV     (80.0-98.0)  fL


 


MCH     (27.0-32.0)  pg


 


MCHC     (31.0-37.0)  g/dL


 


RDW Std Deviation     (28.0-62.0)  fl


 


RDW Coeff of Randi     (11.0-15.0)  %


 


Plt Count     (150-400)  K/uL


 


MPV     (7.40-12.00)  fL


 


Add Manual Diff     


 


Neutrophils % (Manual)     (48.0-80.0)  %


 


Band Neutrophils %     %


 


Lymphocytes % (Manual)     (16.0-40.0)  %


 


Monocytes % (Manual)     (0.0-15.0)  %


 


Nucleated RBC %     /100WBC


 


Absolute Seg Neuts     (1.4-5.7)  


 


Band Neutrophils #     


 


Lymphocytes # (Manual)     (0.6-2.4)  


 


Monocytes # (Manual)     (0.0-0.8)  


 


Nucleated RBCs #     K/uL


 


Sodium     (136-148)  mmol/L


 


Potassium     (3.5-5.1)  mmol/L


 


Chloride     ()  mmol/L


 


Carbon Dioxide     (21.0-32.0)  mmol/L


 


BUN     (7.0-18.0)  mg/dL


 


Creatinine     (0.8-1.3)  mg/dL


 


Est Cr Clr Drug Dosing     mL/min


 


Estimated GFR (MDRD)     ml/min


 


Glucose     ()  mg/dL


 


POC Glucose  92  136 H  169 H  ()  mg/dL


 


Calcium     (8.5-10.1)  mg/dL


 


Troponin I     











Dhruv Results Last 24 Hours: 


 Microbiology











 06/04/19 16:05 Anaerobic Blood Culture - Final





 Blood - Venous 


 


 06/03/19 15:25 Aerobic Blood Culture - Preliminary





 Blood - Venous - Lab Draw    NO GROWTH AFTER 1 DAY





 Anaerobic Blood Culture - Preliminary





    NO GROWTH AFTER 1 DAY


 


 06/03/19 15:15 Aerobic Blood Culture - Preliminary





 Blood - Venous Anaerobic Blood Culture - Preliminary





    NO GROWTH AFTER 1 DAY











Med Orders - Current: 


 Current Medications





Acetaminophen (Tylenol)  650 mg RECTAL Q6H PRN


   PRN Reason: Fever


Albuterol/Ipratropium (Duoneb 3.0-0.5 Mg/3 Ml)  3 ml NEB Q4HRRT PRN


   PRN Reason: Wheezing


Aspirin (Aspirin)  325 mg PO DAILY [facility]


   Last Admin: 06/04/19 08:48 Dose:  Not Given


Donepezil HCl (Aricept)  10 mg PO BEDTIME [facility]


   Last Admin: 06/03/19 20:43 Dose:  Not Given


Heparin Sodium (Porcine) (Heparin Sodium)  5,000 units SUBCUT Q12H [facility]


   Last Admin: 06/04/19 16:45 Dose:  Not Given


Levofloxacin/Dextrose 750 mg/ (Premix)  150 mls @ 100 mls/hr IV Q24H [facility]


   Last Admin: 06/04/19 16:47 Dose:  100 mls/hr


Vancomycin HCl 2 gm/ Sodium (Chloride)  500 mls @ 250 mls/hr IV Q18H [facility]


   Last Admin: 06/04/19 18:11 Dose:  250 mls/hr


Insulin Aspart (Novolog)  0 unit SUBCUT TIDAC [facility]; Protocol


   Last Admin: 06/04/19 18:21 Dose:  Not Given


Memantine (Namenda)  5 mg PO BID [facility]


   Last Admin: 06/04/19 08:49 Dose:  Not Given


Methylprednisolone Sodium Succinate (Solu-Medrol)  40 mg IVPUSH BID [facility]


   Last Admin: 06/04/19 08:26 Dose:  40 mg


Non-Formulary Medication (Levothyroxine Sodium)  175 mcg PO DAILY [facility]


   Last Admin: 06/04/19 08:49 Dose:  Not Given


Ondansetron HCl (Zofran)  4 mg IVPUSH Q4H PRN


   PRN Reason: Nausea


Pantoprazole Sodium (Protonix Iv***)  40 mg IVPUSH Q24H [facility]


   Last Admin: 06/04/19 18:11 Dose:  40 mg


Vancomycin HCl (Pharmacy To Dose - Vancomycin)  1 dose .XX ASDIRECTED [facility]





Discontinued Medications





Albuterol/Ipratropium (Duoneb 3.0-0.5 Mg/3 Ml)  3 ml NEB ONETIME ONE


   Stop: 06/03/19 14:21


   Last Admin: 06/03/19 14:35 Dose:  3 ml


Furosemide (Lasix)  20 mg IVPUSH NOW ONE


   Stop: 06/03/19 17:54


   Last Admin: 06/03/19 18:09 Dose:  20 mg


Furosemide (Lasix)  40 mg IVPUSH NOW ONE


   Stop: 06/04/19 19:50


Potassium Chloride/Sodium Chloride (Normal Saline With 40 Meq Kcl)  1,000 mls @ 

125 mls/hr IV ASDIRECTED [facility]


   Stop: 06/03/19 22:59


Potassium Chloride 20 meq/ (Premix)  50 mls @ 25 mls/hr IV ONETIME ONE


   Stop: 06/03/19 19:53


   Last Admin: 06/03/19 21:05 Dose:  25 mls/hr


Pantoprazole Sodium 40 mg/ (Sodium Chloride)  100 mls @ 10 mls/hr IVPUSH Q24H 

[facility]


   Last Admin: 06/03/19 20:19 Dose:  10 mls/hr


Lorazepam (Ativan)  0.5 mg IM ONETIME ONE


   Stop: 06/03/19 11:53


   Last Admin: 06/03/19 11:59 Dose:  0.5 mg


Lorazepam (Ativan)  1 mg IVPUSH ONETIME ONE


   Stop: 06/03/19 17:56


   Last Admin: 06/03/19 18:09 Dose:  1 mg


Non-Formulary Medication (Pantoprazole Sodium [Protonix])  20 mg PO ASDIRECTED 

[facility]


Pantoprazole Sodium (Protonix Iv***) Confirm Administered Dose 40 mg .ROUTE .STK

-MED ONE


   Stop: 06/03/19 20:16


   Last Admin: 06/03/19 20:19 Dose:  Not Given


Potassium Chloride (Klor-Con M20)  40 meq PO ONETIME ONE


   Stop: 06/03/19 14:58


   Last Admin: 06/03/19 20:09 Dose:  Not Given


Prednisone (Prednisone)  80 mg PO DAILY@14 [facility]











- Exam


General: Alert


Lungs: Rhonchi.  No: Wheezing


Cardiovascular: Tachycardia


GI/Abdominal Exam: Normal Bowel Sounds, Soft, Non-Tender


Extremities: Normal Inspection, No Pedal Edema


Skin: Warm, Dry





- Problem List Review


Problem List Initiated/Reviewed/Updated: Yes





- My Orders


Last 24 Hours: 


My Active Orders





06/04/19 10:40


Blood Glucose Check, Bedside [] WITHMEALSANDBED 





06/04/19 12:09


Echo Comp wo Cont [US] Routine 














- Plan


Plan:: 





A:


1. Healthcare associated pneumonia


2. acute respiratory failure due to above


3. Delirium with underlying dementia


4. Elevated troponin trending down





P:


1. HCAP- treating for suspected pneumonia with levaquin and vancomycin.


2. Acute respiratory failure due to underlying pneumonia. On NC but patient 

taking off NC at times.


3. Elevated troponin, trending down. Ordered Echo.


4. Delirium with underlying dementia. Will try to reorient patient and open 

blinds during day. Added Haldol PRN for restlessness, agitation.





Dispo: pending improvement in respiratory status

## 2019-06-04 NOTE — CR
INDICATION: hypoxia, aspiration, pneumonia



TECHNIQUE:



Chest 1 view. 



COMPARISON:



6/3/19



FINDINGS:



Cardiovascular and mediastinum: Heart size and vasculature are normal in 

caliber and appearance.  Mediastinum is within normal limits.  



Lungs and pleural space: Lungs are clear.  No sign of infiltrate or mass.  

No sign of pleural effusion.  No pneumothorax.  



Bones and soft tissues: No significant findings.  



IMPRESSION:



Unremarkable chest.



Dictated by: Thiago Benson MD @ 06/04/2019 21:04:39



(Electronically Signed)

## 2019-06-05 LAB
CHLORIDE SERPL-SCNC: 105 MMOL/L (ref 98–107)
SODIUM SERPL-SCNC: 146 MMOL/L (ref 136–148)

## 2019-06-05 RX ADMIN — DEXTROSE SCH UNITS: 10 SOLUTION INTRAVENOUS at 15:14

## 2019-06-05 RX ADMIN — DEXTROSE SCH UNITS: 10 SOLUTION INTRAVENOUS at 03:33

## 2019-06-05 RX ADMIN — METHYLPREDNISOLONE SODIUM SUCCINATE SCH MG: 40 INJECTION, POWDER, FOR SOLUTION INTRAMUSCULAR; INTRAVENOUS at 22:51

## 2019-06-05 RX ADMIN — LORAZEPAM PRN MG: 2 INJECTION INTRAMUSCULAR; INTRAVENOUS at 08:54

## 2019-06-05 RX ADMIN — METHYLPREDNISOLONE SODIUM SUCCINATE SCH MG: 40 INJECTION, POWDER, FOR SOLUTION INTRAMUSCULAR; INTRAVENOUS at 09:13

## 2019-06-05 NOTE — PCM.PN
- General Info


Date of Service: 06/05/19


Subjective Update: 





Last night, patient went into respiratory distress, requiring 10 L O2 NC. He 

was diuresed with a total of 80 mg IV of lasix with minimal urine output. 


This morning, patient continues to be tachypneic, requiring 10 L NC. O2 sats in 

90's. 





- Patient Data


Vitals - Most Recent: 


 Last Vital Signs











Temp  37 C   06/05/19 14:00


 


Pulse  113 H  06/05/19 12:00


 


Resp  41 H  06/05/19 14:00


 


BP  142/83 H  06/05/19 14:00


 


Pulse Ox  99   06/05/19 14:00











Weight - Most Recent: 74.571 kg


I&O - Last 24 Hours: 


 Intake & Output











 06/04/19 06/05/19 06/05/19





 22:59 06:59 14:59


 


Intake Total 840  


 


Output Total 720 850 


 


Balance 120 -850 











Lab Results Last 24 Hours: 


 Laboratory Results - last 24 hr











  06/04/19 06/04/19 06/04/19 Range/Units





  18:19 20:35 21:09 


 


ABG pH   7.383   (7.35-7.45)  


 


ABG pCO2   41   (35-45)  mmHG


 


ABG pO2   71 L   ()  mmHG


 


ABG HCO3   24   (22-26)  mEq/L


 


ABG Total CO2   21.9   


 


ABG Base Excess   -0.9   (-2.0-2.0)  


 


Sodium     (136-148)  mmol/L


 


Potassium     (3.5-5.1)  mmol/L


 


Chloride     ()  mmol/L


 


Carbon Dioxide     (21.0-32.0)  mmol/L


 


BUN     (7.0-18.0)  mg/dL


 


Creatinine     (0.8-1.3)  mg/dL


 


Est Cr Clr Drug Dosing     mL/min


 


Estimated GFR (MDRD)     ml/min


 


Glucose     ()  mg/dL


 


POC Glucose  169 H   224 H  ()  mg/dL


 


Calcium     (8.5-10.1)  mg/dL














  06/05/19 06/05/19 06/05/19 Range/Units





  04:55 08:22 12:33 


 


ABG pH     (7.35-7.45)  


 


ABG pCO2     (35-45)  mmHG


 


ABG pO2     ()  mmHG


 


ABG HCO3     (22-26)  mEq/L


 


ABG Total CO2     


 


ABG Base Excess     (-2.0-2.0)  


 


Sodium  146    (136-148)  mmol/L


 


Potassium  3.7    (3.5-5.1)  mmol/L


 


Chloride  105    ()  mmol/L


 


Carbon Dioxide  24.1    (21.0-32.0)  mmol/L


 


BUN  51 H    (7.0-18.0)  mg/dL


 


Creatinine  2.0 H    (0.8-1.3)  mg/dL


 


Est Cr Clr Drug Dosing  27.96    mL/min


 


Estimated GFR (MDRD)  31.8    ml/min


 


Glucose  231 H    ()  mg/dL


 


POC Glucose   194 H  185 H  ()  mg/dL


 


Calcium  9.7    (8.5-10.1)  mg/dL











Dhruv Results Last 24 Hours: 


 Microbiology











 06/03/19 15:15 Aerobic Blood Culture - Preliminary





 Blood - Venous Anaerobic Blood Culture - Preliminary





    NO GROWTH AFTER 1 DAY


 


 06/04/19 22:33 Anaerobic Blood Culture - Final





 Blood - Venous - Lab Draw 


 


 06/04/19 16:05 Anaerobic Blood Culture - Final





 Blood - Venous 


 


 06/03/19 15:25 Aerobic Blood Culture - Preliminary





 Blood - Venous - Lab Draw    NO GROWTH AFTER 1 DAY





 Anaerobic Blood Culture - Preliminary





    NO GROWTH AFTER 1 DAY











Med Orders - Current: 


 Current Medications





Acetaminophen (Tylenol)  650 mg RECTAL Q6H PRN


   PRN Reason: Fever


Albuterol/Ipratropium (Duoneb 3.0-0.5 Mg/3 Ml)  3 ml NEB Q4HRRT PRN


   PRN Reason: Wheezing


   Last Admin: 06/05/19 08:33 Dose:  3 ml


Aspirin (Aspirin)  325 mg PO DAILY [facility]


   Last Admin: 06/05/19 10:34 Dose:  Not Given


Donepezil HCl (Aricept)  10 mg PO BEDTIME [facility]


   Last Admin: 06/04/19 20:26 Dose:  Not Given


Heparin Sodium (Porcine) (Heparin Sodium)  5,000 units SUBCUT Q12H [facility]


   Last Admin: 06/05/19 03:33 Dose:  5,000 units


Levofloxacin/Dextrose 750 mg/ (Premix)  150 mls @ 100 mls/hr IV Q48H [facility]


Vancomycin HCl 1.25 gm/ Sodium (Chloride)  500 mls @ 333.333 mls/hr IV Q24H [facility]


Insulin Aspart (Novolog)  0 unit SUBCUT TIDAC [facility]; Protocol


   Last Admin: 06/05/19 12:38 Dose:  1 unit


Levothyroxine Sodium 75 mcg/ (Levothyroxine Sodium 100 mcg)  175 mcg PO 

ACBREAKFAST [facility]


   Last Admin: 06/05/19 10:34 Dose:  Not Given


Lorazepam (Ativan)  0.5 mg IVPUSH Q6H PRN


   PRN Reason: Anxiety


   Last Admin: 06/05/19 08:54 Dose:  0.5 mg


Memantine (Namenda)  5 mg PO BID [facility]


   Last Admin: 06/05/19 10:34 Dose:  Not Given


Methylprednisolone Sodium Succinate (Solu-Medrol)  40 mg IVPUSH BID [facility]


   Last Admin: 06/05/19 09:13 Dose:  40 mg


Morphine Sulfate (Morphine)  2 mg IVPUSH Q2H PRN


   PRN Reason: SOB/pain


   Last Admin: 06/05/19 10:26 Dose:  2 mg


Ondansetron HCl (Zofran)  4 mg IVPUSH Q4H PRN


   PRN Reason: Nausea


Pantoprazole Sodium (Protonix Iv***)  40 mg IVPUSH Q24H [facility]


   Last Admin: 06/04/19 18:11 Dose:  40 mg


Vancomycin HCl (Pharmacy To Dose - Vancomycin)  1 dose .XX ASDIRECTED [facility]





Discontinued Medications





Albuterol/Ipratropium (Duoneb 3.0-0.5 Mg/3 Ml)  3 ml NEB ONETIME ONE


   Stop: 06/03/19 14:21


   Last Admin: 06/03/19 14:35 Dose:  3 ml


Furosemide (Lasix)  20 mg IVPUSH NOW ONE


   Stop: 06/03/19 17:54


   Last Admin: 06/03/19 18:09 Dose:  20 mg


Furosemide (Lasix)  40 mg IVPUSH NOW ONE


   Stop: 06/04/19 19:50


   Last Admin: 06/04/19 20:25 Dose:  40 mg


Furosemide (Lasix)  20 mg IVPUSH ONETIME ONE


   Stop: 06/04/19 20:31


   Last Admin: 06/04/19 20:30 Dose:  20 mg


Furosemide (Lasix)  20 mg IVPUSH ONETIME ONE


   Stop: 06/04/19 22:31


   Last Admin: 06/04/19 22:38 Dose:  20 mg


Furosemide (Lasix) Confirm Administered Dose 20 mg .ROUTE .STK-MED ONE


   Stop: 06/04/19 22:36


   Last Admin: 06/04/19 22:41 Dose:  20 mg


Levofloxacin/Dextrose 750 mg/ (Premix)  150 mls @ 100 mls/hr IV Q24H [facility]


   Last Admin: 06/04/19 16:47 Dose:  100 mls/hr


Potassium Chloride/Sodium Chloride (Normal Saline With 40 Meq Kcl)  1,000 mls @ 

125 mls/hr IV ASDIRECTED [facility]


   Stop: 06/03/19 22:59


Potassium Chloride 20 meq/ (Premix)  50 mls @ 25 mls/hr IV ONETIME ONE


   Stop: 06/03/19 19:53


   Last Admin: 06/03/19 21:05 Dose:  25 mls/hr


Pantoprazole Sodium 40 mg/ (Sodium Chloride)  100 mls @ 10 mls/hr IVPUSH Q24H 

[facility]


   Last Admin: 06/03/19 20:19 Dose:  10 mls/hr


Vancomycin HCl 2 gm/ Sodium (Chloride)  500 mls @ 250 mls/hr IV Q18H [facility]


   Last Admin: 06/04/19 18:11 Dose:  250 mls/hr


Lorazepam (Ativan)  0.5 mg IM ONETIME ONE


   Stop: 06/03/19 11:53


   Last Admin: 06/03/19 11:59 Dose:  0.5 mg


Lorazepam (Ativan)  1 mg IVPUSH ONETIME ONE


   Stop: 06/03/19 17:56


   Last Admin: 06/03/19 18:09 Dose:  1 mg


Metoprolol Tartrate (Lopressor)  5 mg IVPUSH ONETIME ONE


   Stop: 06/04/19 20:30


   Last Admin: 06/04/19 20:30 Dose:  5 mg


Non-Formulary Medication (Levothyroxine Sodium)  175 mcg PO DAILY [facility]


   Last Admin: 06/04/19 08:49 Dose:  Not Given


Non-Formulary Medication (Pantoprazole Sodium [Protonix])  20 mg PO ASDIRECTED 

[facility]


Pantoprazole Sodium (Protonix Iv***) Confirm Administered Dose 40 mg .ROUTE .STK

-MED ONE


   Stop: 06/03/19 20:16


   Last Admin: 06/03/19 20:19 Dose:  Not Given


Potassium Chloride (Klor-Con M20)  40 meq PO ONETIME ONE


   Stop: 06/03/19 14:58


   Last Admin: 06/03/19 20:09 Dose:  Not Given


Prednisone (Prednisone)  80 mg PO DAILY@14 [facility]











- Exam


Quality Assessment: Supplemental Oxygen


General: Other (arousable, hard of hearing)


Lungs: Other (tachypneic with coarse lung sounds bilaterally)


Cardiovascular: Tachycardia


GI/Abdominal Exam: Soft, Non-Tender, No Distention


Extremities: No Pedal Edema


Skin: Warm, Moist





- Problem List Review


Problem List Initiated/Reviewed/Updated: Yes





- My Orders


Last 24 Hours: 


My Active Orders





06/05/19 Dinner


Mechanical Soft Diet [DIET] 














- Plan


Plan:: 





A:


1. Sepsis 2/2 HCAP and gram positive bacteremia


2. Acute hypoxic respiratory failure due to above


3. Acute kidney injury


4. Metabolic encephalopathy due to above


5. Elevated troponin, stable








P:


1. Sepsis 2/2 HCAP, gram positive bacteremia: Continue current antibiotics 

which include Levaquin and Vancomycin. Pending repeat blood culture results.


2. Acute hypoxic respiratory failure: continue with 10 L NC and can escalate to 

BiPAP if he requires more oxygenation.


3. Acute kidney injury: Will hold off on lasix, encourage PO intake. Recheck 

tomorrow.


4. Elevated troponin, stable: continue Aspirin.


5. Aspiration: placed speech evaluation due to aspiration. Mechanical soft 

diet. Aspiration precautions.





I spoke with family this morning to update them about Mr. Tai's medical 

status. Informed them that the patient was more tachypneic, requiring 10 L O2 

NC and kidney function was worsening and that his status was deteriorating. 

Family decided to keep Mr. Tai comfortable to control his tachypnea and 

continue with NC and if needed, BiPAP. They declined intubation since patient's 

wishes were to be DNR/DNI. In addition, they do not want any more diagnostic 

procedures like CTs, U/S.

## 2019-06-06 LAB
CHLORIDE SERPL-SCNC: 113 MMOL/L (ref 98–107)
SODIUM SERPL-SCNC: 154 MMOL/L (ref 136–148)

## 2019-06-06 RX ADMIN — DEXTROSE SCH UNITS: 10 SOLUTION INTRAVENOUS at 03:29

## 2019-06-06 RX ADMIN — LORAZEPAM PRN MG: 2 INJECTION INTRAMUSCULAR; INTRAVENOUS at 14:11

## 2019-06-06 RX ADMIN — LORAZEPAM PRN MG: 2 INJECTION INTRAMUSCULAR; INTRAVENOUS at 17:50

## 2019-06-06 RX ADMIN — LORAZEPAM PRN MG: 2 INJECTION INTRAMUSCULAR; INTRAVENOUS at 21:29

## 2019-06-06 RX ADMIN — METHYLPREDNISOLONE SODIUM SUCCINATE SCH MG: 40 INJECTION, POWDER, FOR SOLUTION INTRAMUSCULAR; INTRAVENOUS at 08:43

## 2019-06-06 NOTE — PCM.PN
- General Info


Date of Service: 06/06/19


Subjective Update: 





No acute events overnight. Patient breathing room air with O2 sats in low 90's. 

Arousable, however, not oriented.





- Patient Data


Vitals - Most Recent: 


 Last Vital Signs











Temp  37.2 C   06/06/19 10:00


 


Pulse  104 H  06/06/19 10:00


 


Resp  30 H  06/06/19 08:00


 


BP  154/97 H  06/06/19 08:00


 


Pulse Ox  94 L  06/06/19 08:00











Weight - Most Recent: 74.571 kg


I&O - Last 24 Hours: 


 Intake & Output











 06/05/19 06/06/19 06/06/19





 22:59 06:59 14:59


 


Intake Total 710 50 


 


Output Total 450 500 


 


Balance 260 -450 











Lab Results Last 24 Hours: 


 Laboratory Results - last 24 hr











  06/05/19 06/05/19 06/06/19 Range/Units





  12:33 19:36 07:22 


 


WBC     (4.0-11.0)  K/uL


 


RBC     (4.50-5.90)  M/uL


 


Hgb     (13.0-17.0)  g/dL


 


Hct     (38.0-50.0)  %


 


MCV     (80.0-98.0)  fL


 


MCH     (27.0-32.0)  pg


 


MCHC     (31.0-37.0)  g/dL


 


RDW Std Deviation     (28.0-62.0)  fl


 


RDW Coeff of Randi     (11.0-15.0)  %


 


Plt Count     (150-400)  K/uL


 


MPV     (7.40-12.00)  fL


 


Add Manual Diff     


 


Neutrophils % (Manual)     (48.0-80.0)  %


 


Band Neutrophils %     %


 


Lymphocytes % (Manual)     (16.0-40.0)  %


 


Monocytes % (Manual)     (0.0-15.0)  %


 


Metamyelocytes %     %


 


Myelocytes %     %


 


Nucleated RBC %     /100WBC


 


Absolute Seg Neuts     (1.4-5.7)  


 


Band Neutrophils #     


 


Lymphocytes # (Manual)     (0.6-2.4)  


 


Monocytes # (Manual)     (0.0-0.8)  


 


Absolute Metamyelocyte     


 


Absolute Myelocytes     


 


Nucleated RBCs #     K/uL


 


Sodium     (136-148)  mmol/L


 


Potassium     (3.5-5.1)  mmol/L


 


Chloride     ()  mmol/L


 


Carbon Dioxide     (21.0-32.0)  mmol/L


 


BUN     (7.0-18.0)  mg/dL


 


Creatinine     (0.8-1.3)  mg/dL


 


Est Cr Clr Drug Dosing     mL/min


 


Estimated GFR (MDRD)     ml/min


 


Glucose     ()  mg/dL


 


POC Glucose  185 H  204 H  216 H  ()  mg/dL


 


Calcium     (8.5-10.1)  mg/dL


 


Total Bilirubin     (0.2-1.0)  mg/dL


 


AST     (15-37)  IU/L


 


ALT     (14-63)  IU/L


 


Alkaline Phosphatase     ()  U/L


 


Total Protein     (6.4-8.2)  g/dL


 


Albumin     (3.4-5.0)  g/dL


 


Globulin     (2.6-4.0)  g/dL


 


Albumin/Globulin Ratio     (0.9-1.6)  














  06/06/19 06/06/19 Range/Units





  07:29 07:29 


 


WBC  4.11   (4.0-11.0)  K/uL


 


RBC  4.55   (4.50-5.90)  M/uL


 


Hgb  14.0   (13.0-17.0)  g/dL


 


Hct  43.3   (38.0-50.0)  %


 


MCV  95.2   (80.0-98.0)  fL


 


MCH  30.8   (27.0-32.0)  pg


 


MCHC  32.3   (31.0-37.0)  g/dL


 


RDW Std Deviation  49.8   (28.0-62.0)  fl


 


RDW Coeff of Randi  14   (11.0-15.0)  %


 


Plt Count  200   (150-400)  K/uL


 


MPV  10.40   (7.40-12.00)  fL


 


Add Manual Diff  YES   


 


Neutrophils % (Manual)  51   (48.0-80.0)  %


 


Band Neutrophils %  11   %


 


Lymphocytes % (Manual)  24   (16.0-40.0)  %


 


Monocytes % (Manual)  11   (0.0-15.0)  %


 


Metamyelocytes %  2   %


 


Myelocytes %  1   %


 


Nucleated RBC %  0.0   /100WBC


 


Absolute Seg Neuts  2.1   (1.4-5.7)  


 


Band Neutrophils #  0.5   


 


Lymphocytes # (Manual)  1.0   (0.6-2.4)  


 


Monocytes # (Manual)  0.5   (0.0-0.8)  


 


Absolute Metamyelocyte  0.1   


 


Absolute Myelocytes  0   


 


Nucleated RBCs #  0   K/uL


 


Sodium   154 H  (136-148)  mmol/L


 


Potassium   3.0 L  (3.5-5.1)  mmol/L


 


Chloride   113 H  ()  mmol/L


 


Carbon Dioxide   21.2  (21.0-32.0)  mmol/L


 


BUN   82 H  (7.0-18.0)  mg/dL


 


Creatinine   2.2 H  (0.8-1.3)  mg/dL


 


Est Cr Clr Drug Dosing   25.42  mL/min


 


Estimated GFR (MDRD)   28.5  ml/min


 


Glucose   224 H  ()  mg/dL


 


POC Glucose    ()  mg/dL


 


Calcium   9.1  (8.5-10.1)  mg/dL


 


Total Bilirubin   1.1 H  (0.2-1.0)  mg/dL


 


AST   201 H  (15-37)  IU/L


 


ALT   179 H  (14-63)  IU/L


 


Alkaline Phosphatase   67  ()  U/L


 


Total Protein   6.6  (6.4-8.2)  g/dL


 


Albumin   3.0 L  (3.4-5.0)  g/dL


 


Globulin   3.6  (2.6-4.0)  g/dL


 


Albumin/Globulin Ratio   0.8 L  (0.9-1.6)  











Dhruv Results Last 24 Hours: 


 Microbiology











 06/03/19 15:15 Aerobic Blood Culture - Final





 Blood - Venous    Staphylococcus Epidermidis





 Anaerobic Blood Culture - Preliminary





    NO GROWTH AFTER 2 DAYS


 


 06/04/19 22:33 Aerobic Blood Culture - Preliminary





 Blood - Venous - Lab Draw    NO GROWTH AFTER 1 DAY





 Anaerobic Blood Culture - Final


 


 06/04/19 16:05 Aerobic Blood Culture - Preliminary





 Blood - Venous    NO GROWTH AFTER 1 DAY





 Anaerobic Blood Culture - Final


 


 06/03/19 15:25 Aerobic Blood Culture - Preliminary





 Blood - Venous - Lab Draw    NO GROWTH AFTER 2 DAYS





 Anaerobic Blood Culture - Preliminary





    NO GROWTH AFTER 2 DAYS











Med Orders - Current: 


 Current Medications





Acetaminophen (Tylenol)  650 mg RECTAL Q6H PRN


   PRN Reason: Fever


Atropine Sulfate (Atropine 1% Ophth Soln)  0 ml SL Q1H PRN


   PRN Reason: Other


Glycerin (Sani-Supp Pediatric)  1.5 gm RECTAL DAILY PRN


   PRN Reason: Constipation


Lorazepam (Ativan)  1 mg IVPUSH Q6H PRN


   PRN Reason: Anxiety


Morphine Sulfate (Morphine)  2 mg IVPUSH Q2H PRN


   PRN Reason: SOB/pain


   Last Admin: 06/06/19 01:00 Dose:  2 mg


Ondansetron HCl (Zofran)  4 mg IVPUSH Q4H PRN


   PRN Reason: Nausea





Discontinued Medications





Albuterol/Ipratropium (Duoneb 3.0-0.5 Mg/3 Ml)  3 ml NEB ONETIME ONE


   Stop: 06/03/19 14:21


   Last Admin: 06/03/19 14:35 Dose:  3 ml


Albuterol/Ipratropium (Duoneb 3.0-0.5 Mg/3 Ml)  3 ml NEB Q4HRRT PRN


   PRN Reason: Wheezing


   Last Admin: 06/05/19 23:14 Dose:  3 ml


Aspirin (Aspirin)  325 mg PO DAILY [facility]


   Last Admin: 06/06/19 08:33 Dose:  Not Given


Atropine Sulfate (Atropine 1% Ophth Soln)  0 ml SL Q8H [facility]


Donepezil HCl (Aricept)  10 mg PO BEDTIME [facility]


   Last Admin: 06/05/19 20:32 Dose:  Not Given


Furosemide (Lasix)  20 mg IVPUSH NOW ONE


   Stop: 06/03/19 17:54


   Last Admin: 06/03/19 18:09 Dose:  20 mg


Furosemide (Lasix)  40 mg IVPUSH NOW ONE


   Stop: 06/04/19 19:50


   Last Admin: 06/04/19 20:25 Dose:  40 mg


Furosemide (Lasix)  20 mg IVPUSH ONETIME ONE


   Stop: 06/04/19 20:31


   Last Admin: 06/04/19 20:30 Dose:  20 mg


Furosemide (Lasix)  20 mg IVPUSH ONETIME ONE


   Stop: 06/04/19 22:31


   Last Admin: 06/04/19 22:38 Dose:  20 mg


Furosemide (Lasix) Confirm Administered Dose 20 mg .ROUTE .STK-MED ONE


   Stop: 06/04/19 22:36


   Last Admin: 06/04/19 22:41 Dose:  20 mg


Heparin Sodium (Porcine) (Heparin Sodium)  5,000 units SUBCUT Q12H [facility]


   Last Admin: 06/06/19 03:29 Dose:  5,000 units


Levofloxacin/Dextrose 750 mg/ (Premix)  150 mls @ 100 mls/hr IV Q24H [facility]


   Last Admin: 06/04/19 16:47 Dose:  100 mls/hr


Potassium Chloride/Sodium Chloride (Normal Saline With 40 Meq Kcl)  1,000 mls @ 

125 mls/hr IV ASDIRECTED [facility]


   Stop: 06/03/19 22:59


Potassium Chloride 20 meq/ (Premix)  50 mls @ 25 mls/hr IV ONETIME ONE


   Stop: 06/03/19 19:53


   Last Admin: 06/03/19 21:05 Dose:  25 mls/hr


Pantoprazole Sodium 40 mg/ (Sodium Chloride)  100 mls @ 10 mls/hr IVPUSH Q24H 

[facility]


   Last Admin: 06/03/19 20:19 Dose:  10 mls/hr


Vancomycin HCl 2 gm/ Sodium (Chloride)  500 mls @ 250 mls/hr IV Q18H [facility]


   Last Admin: 06/04/19 18:11 Dose:  250 mls/hr


Levofloxacin/Dextrose 750 mg/ (Premix)  150 mls @ 100 mls/hr IV Q48H [facility]


Vancomycin HCl 1.25 gm/ Sodium (Chloride)  500 mls @ 333.333 mls/hr IV Q24H [facility]


   Last Admin: 06/05/19 18:14 Dose:  166 mls/hr


Potassium Chloride 40 meq/ (Dextrose/Water)  1,020 mls @ 50 mls/hr IV 

ASDIRECTED [facility]


   Stop: 06/07/19 04:59


   Last Admin: 06/06/19 09:38 Dose:  50 mls/hr


Insulin Aspart (Novolog)  0 unit SUBCUT TIDAC [facility]; Protocol


   Last Admin: 06/06/19 07:28 Dose:  2 unit


Levothyroxine Sodium 75 mcg/ (Levothyroxine Sodium 100 mcg)  175 mcg PO 

ACBREAKFAST [facility]


   Last Admin: 06/06/19 08:33 Dose:  Not Given


Lorazepam (Ativan)  0.5 mg IM ONETIME ONE


   Stop: 06/03/19 11:53


   Last Admin: 06/03/19 11:59 Dose:  0.5 mg


Lorazepam (Ativan)  1 mg IVPUSH ONETIME ONE


   Stop: 06/03/19 17:56


   Last Admin: 06/03/19 18:09 Dose:  1 mg


Lorazepam (Ativan)  0.5 mg IVPUSH Q6H PRN


   PRN Reason: Anxiety


   Last Admin: 06/05/19 08:54 Dose:  0.5 mg


Memantine (Namenda)  5 mg PO BID [facility]


   Last Admin: 06/06/19 08:34 Dose:  Not Given


Methylprednisolone Sodium Succinate (Solu-Medrol)  40 mg IVPUSH BID [facility]


   Last Admin: 06/06/19 08:43 Dose:  40 mg


Metoprolol Tartrate (Lopressor)  5 mg IVPUSH ONETIME ONE


   Stop: 06/04/19 20:30


   Last Admin: 06/04/19 20:30 Dose:  5 mg


Non-Formulary Medication (Levothyroxine Sodium)  175 mcg PO DAILY [facility]


   Last Admin: 06/04/19 08:49 Dose:  Not Given


Non-Formulary Medication (Pantoprazole Sodium [Protonix])  20 mg PO ASDIRECTED 

[facility]


Pantoprazole Sodium (Protonix Iv***) Confirm Administered Dose 40 mg .ROUTE .STK

-MED ONE


   Stop: 06/03/19 20:16


   Last Admin: 06/03/19 20:19 Dose:  Not Given


Pantoprazole Sodium (Protonix Iv***)  40 mg IVPUSH Q24H [facility]


   Last Admin: 06/05/19 18:21 Dose:  40 mg


Potassium Chloride (Klor-Con M20)  40 meq PO ONETIME ONE


   Stop: 06/03/19 14:58


   Last Admin: 06/03/19 20:09 Dose:  Not Given


Prednisone (Prednisone)  80 mg PO DAILY@14 [facility]


Vancomycin HCl (Pharmacy To Dose - Vancomycin)  1 dose .XX ASDIRECTED [facility]











- Exam


General: Lethargic


Lungs: Other (crackles with rhonchi in right lower  lung field.)


Cardiovascular: Regular Rate, Regular Rhythm


GI/Abdominal Exam: Soft, Non-Tender, No Distention


Extremities: No Pedal Edema


Skin: Warm, Moist





- Problem List Review


Problem List Initiated/Reviewed/Updated: Yes





- My Orders


Last 24 Hours: 


My Active Orders





06/05/19 17:15


Glycerin [Sani-Supp Pediatric]   1.5 gm RECTAL DAILY PRN 





06/05/19 Dinner


Mechanical Soft Diet [DIET] 





06/06/19 11:21


Comfort Measures [OM.PC] Routine 





06/06/19 11:30


Transfer Patient (Change bed) [ADT] Routine 


LORazepam [Ativan]   1 mg IVPUSH Q6H PRN 





06/06/19 11:35


Atropine 1% [Atropine 1% Ophth Soln]   See Dose Instructions  SL Q1H PRN 





06/06/19 11:37


Communication Order [RC] ROUTINE 














- Plan


Plan:: 





A:


1. Sepsis 2/2 HCAP and gram positive bacteremia


2. Acute hypoxic respiratory failure due to above


3. Acute kidney injury


4. Metabolic encephalopathy due to above and underlying dementia


5. Elevated troponin, stable


6. Hypernatremia


7. Hypokalemia








P:


Medical team met with family and patient's POA. They were informed about the 

patient's medical status and poor prognosis. Family and POA have decided to 

transition the patient to comfort care. Will discontinue IV fluids, antibiotics 

and other PO medications. Vitals can be done daily and per family request. Will 

plan to transfer patient to [facility] tomorrow on hospice.

## 2019-06-07 VITALS — SYSTOLIC BLOOD PRESSURE: 112 MMHG | DIASTOLIC BLOOD PRESSURE: 83 MMHG

## 2019-06-07 RX ADMIN — LORAZEPAM PRN MG: 2 INJECTION INTRAMUSCULAR; INTRAVENOUS at 07:38

## 2019-06-07 NOTE — [id number]
<Bhavik Rolle - Last Filed: 06/07/19 12:27>





**Discharge Summary





- Hospital Course


Free Text/Narrative:: 





Dipak Tai is an 87 y/o male with history of dementia, COPD who presented 

to the ER after he was found on the floor and hypoxic at [facility]. 

CT head was negative for any intracranial bleed. Chest xray showed right lung 

basal infiltrate and his troponin was elevated. He was admitted for sepsis 

secondary to pneumonia.  However, through out the hospitalization, the patients 

medical condition worsen. He was found to be aspirating and needing 10 L O2 NC 

to maintain O2 sats above 90%. He was found to have an elevated troponin level 

on admission. In addition, he was started on additional antibiotics for 

suspected bacteremia since one blood culture vial was positive. The patient's 

status on admission was DNR/DNI. The family was updated daily and the decision 

was made to transition him to comfort care with hospice to [facility].





- Discharge Data


Discharge Date: 06/07/19


Discharge Disposition: DC/Tfer to [facility] 03


Condition: Poor





- Patient Summary/Data


Consults: 


 Consultations





06/05/19 09:54


Consult to Speech Language Pathology [SLP Evaluation and Treatment] [CONS] 

Routine 





06/07/19 09:07


Consult to Hospice [CONS] Routine 














- Patient Instructions


Diet: Mechanical Soft (Comfort food.)





- Discharge Plan


*PRESCRIPTION DRUG MONITORING PROGRAM REVIEWED*: Not Applicable


*COPY OF PRESCRIPTION DRUG MONITORING REPORT IN PATIENT OMAR: Not Applicable


Prescriptions/Med Rec: 


LORazepam [Ativan] 1 mg PO Q4H PRN #20 ml


 PRN Reason: Agitation


predniSONE 80 mg PO DAILY #30 tab


Home Medications: 


 Home Meds





LORazepam [Ativan] 1 mg PO Q4H PRN #20 ml 06/07/19 [Rx]


predniSONE 80 mg PO DAILY #30 tab 06/07/19 [Rx]








Patient Handouts:  Hypoxia, Lorazepam tablets, Prednisone tablets


Referrals: 


Beau Ragland MD [Physician] -  (Dr. Ragland will see him on next [place] 

rounds. )





- Discharge Summary/Plan Comment


DC Time >30 min.: No





- Patient Data


Vitals - Most Recent: 


 Last Vital Signs











Temp  36.6 C   06/07/19 07:36


 


Pulse  116 H  06/07/19 07:36


 


Resp  20   06/07/19 07:36


 


BP  138/90   06/07/19 07:36


 


Pulse Ox  93 L  06/07/19 07:36











Weight - Most Recent: 74.571 kg


I&O - Last 24 hours: 


 Intake & Output











 06/06/19 06/07/19 06/07/19





 22:59 06:59 14:59


 


Intake Total  0 


 


Output Total  450 


 


Balance  -450 











GABY Results - Last 24 hrs: 


 Microbiology











 06/04/19 22:33 Aerobic Blood Culture - Preliminary





 Blood - Venous - Lab Draw    NO GROWTH AFTER 2 DAYS





 Anaerobic Blood Culture - Final


 


 06/04/19 16:05 Aerobic Blood Culture - Preliminary





 Blood - Venous    NO GROWTH AFTER 2 DAYS





 Anaerobic Blood Culture - Final


 


 06/03/19 15:25 Aerobic Blood Culture - Preliminary





 Blood - Venous - Lab Draw    NO GROWTH AFTER 3 DAYS





 Anaerobic Blood Culture - Preliminary





    NO GROWTH AFTER 3 DAYS


 


 06/03/19 15:15 Aerobic Blood Culture - Final





 Blood - Venous    Staphylococcus Epidermidis





 Anaerobic Blood Culture - Preliminary





    NO GROWTH AFTER 3 DAYS











Med Orders - Current: 


 Current Medications





Acetaminophen (Tylenol)  650 mg RECTAL Q6H PRN


   PRN Reason: Fever


Atropine Sulfate (Atropine 1% Ophth Soln)  0 ml SL Q1H PRN


   PRN Reason: Other


Glycerin (Sani-Supp Pediatric)  1.5 gm RECTAL DAILY PRN


   PRN Reason: Constipation


Lorazepam (Ativan)  1 mg IVPUSH Q4H PRN


   PRN Reason: Anxiety


   Last Admin: 06/07/19 07:38 Dose:  1 mg


Morphine Sulfate (Morphine)  2 mg IVPUSH Q2H PRN


   PRN Reason: SOB/pain


   Last Admin: 06/06/19 01:00 Dose:  2 mg


Ondansetron HCl (Zofran)  4 mg IVPUSH Q4H PRN


   PRN Reason: Nausea





Discontinued Medications





Albuterol/Ipratropium (Duoneb 3.0-0.5 Mg/3 Ml)  3 ml NEB ONETIME ONE


   Stop: 06/03/19 14:21


   Last Admin: 06/03/19 14:35 Dose:  3 ml


Albuterol/Ipratropium (Duoneb 3.0-0.5 Mg/3 Ml)  3 ml NEB Q4HRRT PRN


   PRN Reason: Wheezing


   Last Admin: 06/05/19 23:14 Dose:  3 ml


Aspirin (Aspirin)  325 mg PO DAILY [facility]


   Last Admin: 06/06/19 08:33 Dose:  Not Given


Atropine Sulfate (Atropine 1% Ophth Soln)  0 ml SL Q8H [facility]


   Last Admin: 06/06/19 12:35 Dose:  Not Given


Donepezil HCl (Aricept)  10 mg PO BEDTIME [facility]


   Last Admin: 06/05/19 20:32 Dose:  Not Given


Furosemide (Lasix)  20 mg IVPUSH NOW ONE


   Stop: 06/03/19 17:54


   Last Admin: 06/03/19 18:09 Dose:  20 mg


Furosemide (Lasix)  40 mg IVPUSH NOW ONE


   Stop: 06/04/19 19:50


   Last Admin: 06/04/19 20:25 Dose:  40 mg


Furosemide (Lasix)  20 mg IVPUSH ONETIME ONE


   Stop: 06/04/19 20:31


   Last Admin: 06/04/19 20:30 Dose:  20 mg


Furosemide (Lasix)  20 mg IVPUSH ONETIME ONE


   Stop: 06/04/19 22:31


   Last Admin: 06/04/19 22:38 Dose:  20 mg


Furosemide (Lasix) Confirm Administered Dose 20 mg .ROUTE .STK-MED ONE


   Stop: 06/04/19 22:36


   Last Admin: 06/04/19 22:41 Dose:  20 mg


Heparin Sodium (Porcine) (Heparin Sodium)  5,000 units SUBCUT Q12H [facility]


   Last Admin: 06/06/19 03:29 Dose:  5,000 units


Levofloxacin/Dextrose 750 mg/ (Premix)  150 mls @ 100 mls/hr IV Q24H [facility]


   Last Admin: 06/04/19 16:47 Dose:  100 mls/hr


Potassium Chloride/Sodium Chloride (Normal Saline With 40 Meq Kcl)  1,000 mls @ 

125 mls/hr IV ASDIRECTED [facility]


   Stop: 06/03/19 22:59


Potassium Chloride 20 meq/ (Premix)  50 mls @ 25 mls/hr IV ONETIME ONE


   Stop: 06/03/19 19:53


   Last Admin: 06/03/19 21:05 Dose:  25 mls/hr


Pantoprazole Sodium 40 mg/ (Sodium Chloride)  100 mls @ 10 mls/hr IVPUSH Q24H 

[facility]


   Last Admin: 06/03/19 20:19 Dose:  10 mls/hr


Vancomycin HCl 2 gm/ Sodium (Chloride)  500 mls @ 250 mls/hr IV Q18H [facility]


   Last Admin: 06/04/19 18:11 Dose:  250 mls/hr


Levofloxacin/Dextrose 750 mg/ (Premix)  150 mls @ 100 mls/hr IV Q48H [facility]


Vancomycin HCl 1.25 gm/ Sodium (Chloride)  500 mls @ 333.333 mls/hr IV Q24H [facility]


   Last Admin: 06/05/19 18:14 Dose:  166 mls/hr


Potassium Chloride 40 meq/ (Dextrose/Water)  1,020 mls @ 50 mls/hr IV 

ASDIRECTED [facility]


   Stop: 06/07/19 04:59


   Last Admin: 06/06/19 09:38 Dose:  50 mls/hr


Insulin Aspart (Novolog)  0 unit SUBCUT TIDAC [facility]; Protocol


   Last Admin: 06/06/19 07:28 Dose:  2 unit


Levothyroxine Sodium 75 mcg/ (Levothyroxine Sodium 100 mcg)  175 mcg PO 

ACBREAKFAST [facility]


   Last Admin: 06/06/19 08:33 Dose:  Not Given


Lorazepam (Ativan)  0.5 mg IM ONETIME ONE


   Stop: 06/03/19 11:53


   Last Admin: 06/03/19 11:59 Dose:  0.5 mg


Lorazepam (Ativan)  1 mg IVPUSH ONETIME ONE


   Stop: 06/03/19 17:56


   Last Admin: 06/03/19 18:09 Dose:  1 mg


Lorazepam (Ativan)  0.5 mg IVPUSH Q6H PRN


   PRN Reason: Anxiety


   Last Admin: 06/05/19 08:54 Dose:  0.5 mg


Lorazepam (Ativan)  1 mg IVPUSH Q6H PRN


   PRN Reason: Anxiety


   Last Admin: 06/06/19 17:50 Dose:  1 mg


Memantine (Namenda)  5 mg PO BID [facility]


   Last Admin: 06/06/19 08:34 Dose:  Not Given


Methylprednisolone Sodium Succinate (Solu-Medrol)  40 mg IVPUSH BID [facility]


   Last Admin: 06/06/19 08:43 Dose:  40 mg


Metoprolol Tartrate (Lopressor)  5 mg IVPUSH ONETIME ONE


   Stop: 06/04/19 20:30


   Last Admin: 06/04/19 20:30 Dose:  5 mg


Non-Formulary Medication (Levothyroxine Sodium)  175 mcg PO DAILY [facility]


   Last Admin: 06/04/19 08:49 Dose:  Not Given


Non-Formulary Medication (Pantoprazole Sodium [Protonix])  20 mg PO ASDIRECTED 

[facility]


Pantoprazole Sodium (Protonix Iv***) Confirm Administered Dose 40 mg .ROUTE .STK

-MED ONE


   Stop: 06/03/19 20:16


   Last Admin: 06/03/19 20:19 Dose:  Not Given


Pantoprazole Sodium (Protonix Iv***)  40 mg IVPUSH Q24H [facility]


   Last Admin: 06/05/19 18:21 Dose:  40 mg


Potassium Chloride (Klor-Con M20)  40 meq PO ONETIME ONE


   Stop: 06/03/19 14:58


   Last Admin: 06/03/19 20:09 Dose:  Not Given


Prednisone (Prednisone)  80 mg PO DAILY@14 [facility]


Vancomycin HCl (Pharmacy To Dose - Vancomycin)  1 dose .XX ASDIRECTED [facility]











<Bola Jones - Last Filed: 06/09/19 19:25>





**Discharge Summary





- Patient Summary/Data


Consults: 


 Consultations





06/05/19 09:54


Consult to Speech Language Pathology [SLP Evaluation and Treatment] [CONS] 

Routine 





06/07/19 09:07


Consult to Hospice [CONS] Routine 














- Patient Data


Vitals - Most Recent: 


 Last Vital Signs











Temp  35.7 C   06/07/19 11:50


 


Pulse  97   06/07/19 11:50


 


Resp  22 H  06/07/19 11:50


 


BP  112/83   06/07/19 11:50


 


Pulse Ox  95   06/07/19 11:50











GABY Results - Last 24 hrs: 


 Microbiology











 06/04/19 16:05 Aerobic Blood Culture - Final





 Blood - Venous    NO GROWTH AFTER 5 DAYS





 Anaerobic Blood Culture - Final


 


 06/04/19 22:33 Aerobic Blood Culture - Preliminary





 Blood - Venous - Lab Draw    NO GROWTH AFTER 4 DAYS





 Anaerobic Blood Culture - Final


 


 06/03/19 15:25 Aerobic Blood Culture - Final





 Blood - Venous - Lab Draw    NO GROWTH AFTER 5 DAYS





 Anaerobic Blood Culture - Final





    NO GROWTH AFTER 5 DAYS


 


 06/03/19 15:15 Aerobic Blood Culture - Final





 Blood - Venous    Staphylococcus Epidermidis





 Anaerobic Blood Culture - Final





    NO GROWTH AFTER 5 DAYS











Med Orders - Current: 


 Current Medications








Discontinued Medications





Acetaminophen (Tylenol)  650 mg RECTAL Q6H PRN


   PRN Reason: Fever


Albuterol/Ipratropium (Duoneb 3.0-0.5 Mg/3 Ml)  3 ml NEB ONETIME ONE


   Stop: 06/03/19 14:21


   Last Admin: 06/03/19 14:35 Dose:  3 ml


Albuterol/Ipratropium (Duoneb 3.0-0.5 Mg/3 Ml)  3 ml NEB Q4HRRT PRN


   PRN Reason: Wheezing


   Last Admin: 06/05/19 23:14 Dose:  3 ml


Aspirin (Aspirin)  325 mg PO DAILY [facility]


   Last Admin: 06/06/19 08:33 Dose:  Not Given


Atropine Sulfate (Atropine 1% Ophth Soln)  0 ml SL Q8H [facility]


   Last Admin: 06/06/19 12:35 Dose:  Not Given


Atropine Sulfate (Atropine 1% Ophth Soln)  0 ml SL Q1H PRN


   PRN Reason: Other


Donepezil HCl (Aricept)  10 mg PO BEDTIME [facility]


   Last Admin: 06/05/19 20:32 Dose:  Not Given


Furosemide (Lasix)  20 mg IVPUSH NOW ONE


   Stop: 06/03/19 17:54


   Last Admin: 06/03/19 18:09 Dose:  20 mg


Furosemide (Lasix)  40 mg IVPUSH NOW ONE


   Stop: 06/04/19 19:50


   Last Admin: 06/04/19 20:25 Dose:  40 mg


Furosemide (Lasix)  20 mg IVPUSH ONETIME ONE


   Stop: 06/04/19 20:31


   Last Admin: 06/04/19 20:30 Dose:  20 mg


Furosemide (Lasix)  20 mg IVPUSH ONETIME ONE


   Stop: 06/04/19 22:31


   Last Admin: 06/04/19 22:38 Dose:  20 mg


Furosemide (Lasix) Confirm Administered Dose 20 mg .ROUTE .STK-MED ONE


   Stop: 06/04/19 22:36


   Last Admin: 06/04/19 22:41 Dose:  20 mg


Glycerin (Sani-Supp Pediatric)  1.5 gm RECTAL DAILY PRN


   PRN Reason: Constipation


Heparin Sodium (Porcine) (Heparin Sodium)  5,000 units SUBCUT Q12H [facility]


   Last Admin: 06/06/19 03:29 Dose:  5,000 units


Levofloxacin/Dextrose 750 mg/ (Premix)  150 mls @ 100 mls/hr IV Q24H [facility]


   Last Admin: 06/04/19 16:47 Dose:  100 mls/hr


Potassium Chloride/Sodium Chloride (Normal Saline With 40 Meq Kcl)  1,000 mls @ 

125 mls/hr IV ASDIRECTED [facility]


   Stop: 06/03/19 22:59


Potassium Chloride 20 meq/ (Premix)  50 mls @ 25 mls/hr IV ONETIME ONE


   Stop: 06/03/19 19:53


   Last Admin: 06/03/19 21:05 Dose:  25 mls/hr


Pantoprazole Sodium 40 mg/ (Sodium Chloride)  100 mls @ 10 mls/hr IVPUSH Q24H 

[facility]


   Last Admin: 06/03/19 20:19 Dose:  10 mls/hr


Vancomycin HCl 2 gm/ Sodium (Chloride)  500 mls @ 250 mls/hr IV Q18H [facility]


   Last Admin: 06/04/19 18:11 Dose:  250 mls/hr


Levofloxacin/Dextrose 750 mg/ (Premix)  150 mls @ 100 mls/hr IV Q48H [facility]


Vancomycin HCl 1.25 gm/ Sodium (Chloride)  500 mls @ 333.333 mls/hr IV Q24H [facility]


   Last Admin: 06/05/19 18:14 Dose:  166 mls/hr


Potassium Chloride 40 meq/ (Dextrose/Water)  1,020 mls @ 50 mls/hr IV 

ASDIRECTED [facility]


   Stop: 06/07/19 04:59


   Last Admin: 06/06/19 09:38 Dose:  50 mls/hr


Insulin Aspart (Novolog)  [facility]


   Last Admin: 06/06/19 07:28 Dose:  2 unit


Levothyroxine Sodium 75 mcg/ (Levothyroxine Sodium 100 mcg)  175 mcg PO 

ACBREAKFAST [facility]


   Last Admin: 06/06/19 08:33 Dose:  Not Given


Lorazepam (Ativan)  0.5 mg IM ONETIME ONE


   Stop: 06/03/19 11:53


   Last Admin: 06/03/19 11:59 Dose:  0.5 mg


Lorazepam (Ativan)  1 mg IVPUSH ONETIME ONE


   Stop: 06/03/19 17:56


   Last Admin: 06/03/19 18:09 Dose:  1 mg


Lorazepam (Ativan)  0.5 mg IVPUSH Q6H PRN


   PRN Reason: Anxiety


   Last Admin: 06/05/19 08:54 Dose:  0.5 mg


Lorazepam (Ativan)  1 mg IVPUSH Q6H PRN


   PRN Reason: Anxiety


   Last Admin: 06/06/19 17:50 Dose:  1 mg


Lorazepam (Ativan)  1 mg IVPUSH Q4H PRN


   PRN Reason: Anxiety


   Last Admin: 06/07/19 07:38 Dose:  1 mg


Memantine (Namenda)  5 mg PO BID [facility]


   Last Admin: 06/06/19 08:34 Dose:  Not Given


Methylprednisolone Sodium Succinate (Solu-Medrol)  40 mg IVPUSH BID [facility]


   Last Admin: 06/06/19 08:43 Dose:  40 mg


Metoprolol Tartrate (Lopressor)  5 mg IVPUSH ONETIME ONE


   Stop: 06/04/19 20:30


   Last Admin: 06/04/19 20:30 Dose:  5 mg


Morphine Sulfate (Morphine)  2 mg IVPUSH Q2H PRN


   PRN Reason: SOB/pain


   Last Admin: 06/07/19 10:56 Dose:  2 mg


Non-Formulary Medication (Levothyroxine Sodium)  175 mcg PO DAILY [facility]


   Last Admin: 06/04/19 08:49 Dose:  Not Given


Non-Formulary Medication (Pantoprazole Sodium [Protonix])  20 mg PO ASDIRECTED 

[facility]


Ondansetron HCl (Zofran)  4 mg IVPUSH Q4H PRN


   PRN Reason: Nausea


Pantoprazole Sodium (Protonix Iv***) Confirm Administered Dose 40 mg .ROUTE .STK

-MED ONE


   Stop: 06/03/19 20:16


   Last Admin: 06/03/19 20:19 Dose:  Not Given


Pantoprazole Sodium (Protonix Iv***)  40 mg IVPUSH Q24H [facility]


   Last Admin: 06/05/19 18:21 Dose:  40 mg


Potassium Chloride (Klor-Con M20)  40 meq PO ONETIME ONE


   Stop: 06/03/19 14:58


   Last Admin: 06/03/19 20:09 Dose:  Not Given


Prednisone (Prednisone)  80 mg PO DAILY@14 [facility]


Vancomycin HCl (Pharmacy To Dose - Vancomycin)  1 dose .XX ASDIRECTED [facility]











- Free Text/Narrative


Note: 





I have examined the patient.  I have discussed findings and treatment plan with 

the resident.  I agree with the assessment and plan outlined in the following 

resident's note.